# Patient Record
Sex: MALE | Race: WHITE | NOT HISPANIC OR LATINO | Employment: FULL TIME | URBAN - METROPOLITAN AREA
[De-identification: names, ages, dates, MRNs, and addresses within clinical notes are randomized per-mention and may not be internally consistent; named-entity substitution may affect disease eponyms.]

---

## 2018-08-06 ENCOUNTER — TELEPHONE (OUTPATIENT)
Dept: FAMILY MEDICINE | Facility: CLINIC | Age: 48
End: 2018-08-06

## 2018-08-06 DIAGNOSIS — Z12.5 SCREENING FOR PROSTATE CANCER: ICD-10-CM

## 2018-08-06 DIAGNOSIS — Z13.6 CARDIOVASCULAR SCREENING; LDL GOAL LESS THAN 100: Primary | ICD-10-CM

## 2018-08-06 DIAGNOSIS — Z13.1 SCREENING FOR DIABETES MELLITUS: ICD-10-CM

## 2018-08-07 PROBLEM — Z13.6 CARDIOVASCULAR SCREENING; LDL GOAL LESS THAN 100: Status: ACTIVE | Noted: 2018-08-07

## 2018-08-07 NOTE — TELEPHONE ENCOUNTER
Pt returning call and given message. Lab appt scheduled 8-13-18.    [facility]

## 2018-08-07 NOTE — TELEPHONE ENCOUNTER
I ordered his future labs,  Lipids, glucose and screen for prostate cancer.  He can get these done ahead of time if he wants to.  Raoul Kaufman MD  Family Medicine

## 2018-08-07 NOTE — TELEPHONE ENCOUNTER
Reason for call:  Order   Order or referral being requested: Fasting labs  Reason for request: physical  Date needed: as soon as possible  Has the patient been seen by the PCP for this problem? NO    Additional comments: patient want's labs done before appointment    Phone number to reach patient:  Home number on file [phone] (home)    Best Time:  anytime    Can we leave a detailed message on this number?  Not Applicable

## 2018-08-07 NOTE — TELEPHONE ENCOUNTER
Dr. Kaufman-    Patient has not yet been evaluated in 2 years. He didn't complete his fasting labs at that time (ordered by Erin- I extended these but I am not sure if they should be reordered for time line?). He is seeing you for a physical and would like to complete labs before that appt. Do you want anything further than lipids or would you like to see him first?    Karen Allen, SILVANON, RN

## 2018-08-13 DIAGNOSIS — Z13.1 SCREENING FOR DIABETES MELLITUS: ICD-10-CM

## 2018-08-13 DIAGNOSIS — Z12.5 SCREENING FOR PROSTATE CANCER: ICD-10-CM

## 2018-08-13 DIAGNOSIS — Z13.6 CARDIOVASCULAR SCREENING; LDL GOAL LESS THAN 100: ICD-10-CM

## 2018-08-13 LAB
CHOLEST SERPL-MCNC: 227 MG/DL
GLUCOSE SERPL-MCNC: 102 MG/DL (ref 70–99)
HDLC SERPL-MCNC: 37 MG/DL
LDLC SERPL CALC-MCNC: 119 MG/DL
NONHDLC SERPL-MCNC: 190 MG/DL
PSA SERPL-ACNC: 0.32 UG/L (ref 0–4)
TRIGL SERPL-MCNC: 355 MG/DL

## 2018-08-13 PROCEDURE — 36415 COLL VENOUS BLD VENIPUNCTURE: CPT | Performed by: FAMILY MEDICINE

## 2018-08-13 PROCEDURE — 82947 ASSAY GLUCOSE BLOOD QUANT: CPT | Performed by: FAMILY MEDICINE

## 2018-08-13 PROCEDURE — G0103 PSA SCREENING: HCPCS | Performed by: FAMILY MEDICINE

## 2018-08-13 PROCEDURE — 80061 LIPID PANEL: CPT | Performed by: FAMILY MEDICINE

## 2018-08-22 ENCOUNTER — OFFICE VISIT (OUTPATIENT)
Dept: FAMILY MEDICINE | Facility: CLINIC | Age: 48
End: 2018-08-22
Payer: COMMERCIAL

## 2018-08-22 VITALS
TEMPERATURE: 98.3 F | SYSTOLIC BLOOD PRESSURE: 118 MMHG | BODY MASS INDEX: 26.01 KG/M2 | WEIGHT: 192 LBS | DIASTOLIC BLOOD PRESSURE: 72 MMHG | HEART RATE: 72 BPM | HEIGHT: 72 IN

## 2018-08-22 DIAGNOSIS — Z12.11 SPECIAL SCREENING FOR MALIGNANT NEOPLASMS, COLON: ICD-10-CM

## 2018-08-22 DIAGNOSIS — Z00.00 ENCOUNTER FOR ROUTINE ADULT HEALTH EXAMINATION WITHOUT ABNORMAL FINDINGS: Primary | ICD-10-CM

## 2018-08-22 DIAGNOSIS — E78.1 HYPERTRIGLYCERIDEMIA: ICD-10-CM

## 2018-08-22 PROBLEM — Z13.6 CARDIOVASCULAR SCREENING; LDL GOAL LESS THAN 100: Status: RESOLVED | Noted: 2018-08-07 | Resolved: 2018-08-22

## 2018-08-22 PROCEDURE — 99396 PREV VISIT EST AGE 40-64: CPT | Performed by: FAMILY MEDICINE

## 2018-08-22 NOTE — PATIENT INSTRUCTIONS
Please make some dietary changes.    Recheck in 3 months.    Please get a colonoscopy.          Thank you for choosing [facility].  You may be receiving a survey in the mail from Jessica Hernandez regarding your visit today.  Please take a few minutes to complete and return the survey to let us know how we are doing.      If you have questions or concerns, please contact us via [employer] or you can contact your care team at [phone].    Our Clinic hours are:  Monday 6:40 am  to 7:00 pm  Tuesday -Friday 6:40 am to 5:00 pm    The [place] outpatient lab hours are:  Monday - Friday 6:10 am to 4:45 pm  Saturdays 7:00 am to 11:00 am  Appointments are required, call [phone]    If you have clinical questions after hours or would like to schedule an appointment,  call the clinic at [phone].          Preventive Health Recommendations  Male Ages 50 - 64    Yearly exam:             See your health care provider every year in order to  o   Review health changes.   o   Discuss preventive care.    o   Review your medicines if your doctor has prescribed any.     Have a cholesterol test every 5 years, or more frequently if you are at risk for high cholesterol/heart disease.     Have a diabetes test (fasting glucose) every three years. If you are at risk for diabetes, you should have this test more often.     Have a colonoscopy at age 50, or have a yearly FIT test (stool test). These exams will check for colon cancer.      Talk with your health care provider about whether or not a prostate cancer screening test (PSA) is right for you.    You should be tested each year for STDs (sexually transmitted diseases), if you re at risk.     Shots: Get a flu shot each year. Get a tetanus shot every 10 years.     Nutrition:    Eat at least 5 servings of fruits and vegetables daily.     Eat whole-grain bread, whole-wheat pasta and brown rice instead of white grains and rice.     Get adequate Calcium and Vitamin D.      Lifestyle    Exercise for at least 150 minutes a week (30 minutes a day, 5 days a week). This will help you control your weight and prevent disease.     Limit alcohol to one drink per day.     No smoking.     Wear sunscreen to prevent skin cancer.     See your dentist every six months for an exam and cleaning.     See your eye doctor every 1 to 2 years.

## 2018-08-22 NOTE — PROGRESS NOTES
"    SUBJECTIVE:   CC: Too Osman is an 48 year old male who presents for preventative health visit.   Chief Complaint   Patient presents with     Physical     Health Maintenance     due for colon cancer screening. Due for tetanus. He is declining the tetanus, he had a \"bad reaction\" to the last one.     Healthy Habits:    Do you get at least three servings of calcium containing foods daily (dairy, green leafy vegetables, etc.)? yes    Amount of exercise or daily activities, outside of work: 6-7 day(s) per week    Problems taking medications regularly not applicable    Medication side effects: No    Have you had an eye exam in the past two years? no    Do you see a dentist twice per year? yes    Do you have sleep apnea, excessive snoring or daytime drowsiness?no         Today's PHQ-2 Score:   PHQ-2 ( 1999 Pfizer) 8/22/2018   Q1: Little interest or pleasure in doing things 0   Q2: Feeling down, depressed or hopeless 0   PHQ-2 Score 0       Abuse: Current or Past(Physical, Sexual or Emotional)- No  Do you feel safe in your environment - Yes    Social History   Substance Use Topics     Smoking status: Never Smoker     Smokeless tobacco: Current User      Comment: tin lasts 4 days     Alcohol use 0.0 oz/week     0 Standard drinks or equivalent per week      Comment: 4-6 per week      If you drink alcohol do you typically have >3 drinks per day or >7 drinks per week? No                      Last PSA:   PSA   Date Value Ref Range Status   08/13/2018 0.32 0 - 4 ug/L Final     Comment:     Assay Method:  Chemiluminescence using Siemens Vista analyzer       Reviewed orders with patient. Reviewed health maintenance and updated orders accordingly - Yes      Reviewed and updated as needed this visit by clinical staff  Tobacco  Allergies  Med Hx  Surg Hx  Fam Hx  Soc Hx        Reviewed and updated as needed this visit by Provider            ROS:  CONSTITUTIONAL: NEGATIVE for fever, chills, change in " "weight  INTEGUMENTARY/SKIN: NEGATIVE for worrisome rashes, moles or lesions  EYES: NEGATIVE for vision changes or irritation  ENT: NEGATIVE for ear, mouth and throat problems  RESP: NEGATIVE for significant cough or SOB  CV: NEGATIVE for chest pain, palpitations or peripheral edema  GI: NEGATIVE for nausea, abdominal pain, heartburn, or change in bowel habits   male: negative for dysuria, hematuria, decreased urinary stream, erectile dysfunction, urethral discharge  MUSCULOSKELETAL: NEGATIVE for significant arthralgias or myalgia  NEURO: NEGATIVE for weakness, dizziness or paresthesias  PSYCHIATRIC: NEGATIVE for changes in mood or affect    OBJECTIVE:   /72 (Cuff Size: Adult Large)  Pulse 72  Temp 98.3  F (36.8  C) (Tympanic)  Ht 6' 0.25\" (1.835 m)  Wt 192 lb (87.1 kg)  BMI 25.86 kg/m2  EXAM:  GENERAL: healthy, alert and no distress  EYES: Eyes grossly normal to inspection, PERRL and conjunctivae and sclerae normal  HENT: ear canals and TM's normal, nose and mouth without ulcers or lesions  NECK: no adenopathy, no asymmetry, masses, or scars and thyroid normal to palpation  RESP: lungs clear to auscultation - no rales, rhonchi or wheezes  CV: regular rate and rhythm, normal S1 S2, no S3 or S4, no murmur, click or rub, no peripheral edema and peripheral pulses strong  ABDOMEN: soft, nontender, no hepatosplenomegaly, no masses and bowel sounds normal   (male): normal male genitalia without lesions or urethral discharge, no hernia  MS: no gross musculoskeletal defects noted, no edema  SKIN: no suspicious lesions or rashes  SKIN: even brown macule on right shin 6 mm in diameter, no other worrisome signs, monitor   NEURO: Normal strength and tone, mentation intact and speech normal  PSYCH: mentation appears normal, affect normal/bright  LYMPH: anterior cervical: no adenopathy  posterior cervical: no adenopathy        ASSESSMENT/PLAN:   (Z00.00) Encounter for routine adult health examination without " "abnormal findings  (primary encounter diagnosis)  Comment: Discussed healthy lifestyle and preventative cares.    Plan:     (E78.1) Hypertriglyceridemia  Comment: diet and exercise  Plan: Lipid panel reflex to direct LDL Fasting            (Z12.11) Special screening for malignant neoplasms, colon  Comment:   Plan: GASTROENTEROLOGY ADULT REF PROCEDURE ONLY              COUNSELING:  Reviewed preventive health counseling, as reflected in patient instructions       Regular exercise       Healthy diet/nutrition       Vision screening       Hearing screening       Colon cancer screening       Prostate cancer screening    BP Readings from Last 1 Encounters:   08/22/18 118/72     Estimated body mass index is 25.86 kg/(m^2) as calculated from the following:    Height as of this encounter: 6' 0.25\" (1.835 m).    Weight as of this encounter: 192 lb (87.1 kg).           reports that he has never smoked. He uses smokeless tobacco.      Counseling Resources:  ATP IV Guidelines  Pooled Cohorts Equation Calculator  FRAX Risk Assessment  ICSI Preventive Guidelines  Dietary Guidelines for Americans, 2010  USDA's MyPlate  ASA Prophylaxis  Lung CA Screening    Raoul Kaufman MD  [facility]  "

## 2018-08-22 NOTE — MR AVS SNAPSHOT
After Visit Summary   8/22/2018    Too Osman    MRN: [id number]           Patient Information     Date Of Birth          1970        Visit Information        Provider Department      8/22/2018 8:00 AM Raoul Kaufman MD [facility]        Today's Diagnoses     Encounter for routine adult health examination without abnormal findings    -  1    Hypertriglyceridemia        Special screening for malignant neoplasms, colon          Care Instructions    Please make some dietary changes.    Recheck in 3 months.    Please get a colonoscopy.          Thank you for choosing [facility].  You may be receiving a survey in the mail from Jessica Hernandez regarding your visit today.  Please take a few minutes to complete and return the survey to let us know how we are doing.      If you have questions or concerns, please contact us via [employer] or you can contact your care team at [phone].    Our Clinic hours are:  Monday 6:40 am  to 7:00 pm  Tuesday -Friday 6:40 am to 5:00 pm    The [place] outpatient lab hours are:  Monday - Friday 6:10 am to 4:45 pm  Saturdays 7:00 am to 11:00 am  Appointments are required, call [phone]    If you have clinical questions after hours or would like to schedule an appointment,  call the clinic at [phone].          Preventive Health Recommendations  Male Ages 50 - 64    Yearly exam:             See your health care provider every year in order to  o   Review health changes.   o   Discuss preventive care.    o   Review your medicines if your doctor has prescribed any.     Have a cholesterol test every 5 years, or more frequently if you are at risk for high cholesterol/heart disease.     Have a diabetes test (fasting glucose) every three years. If you are at risk for diabetes, you should have this test more often.     Have a colonoscopy at age 50, or have a yearly FIT test (stool test). These exams will check for colon cancer.      Talk with  your health care provider about whether or not a prostate cancer screening test (PSA) is right for you.    You should be tested each year for STDs (sexually transmitted diseases), if you re at risk.     Shots: Get a flu shot each year. Get a tetanus shot every 10 years.     Nutrition:    Eat at least 5 servings of fruits and vegetables daily.     Eat whole-grain bread, whole-wheat pasta and brown rice instead of white grains and rice.     Get adequate Calcium and Vitamin D.     Lifestyle    Exercise for at least 150 minutes a week (30 minutes a day, 5 days a week). This will help you control your weight and prevent disease.     Limit alcohol to one drink per day.     No smoking.     Wear sunscreen to prevent skin cancer.     See your dentist every six months for an exam and cleaning.     See your eye doctor every 1 to 2 years.            Follow-ups after your visit        Additional Services     GASTROENTEROLOGY ADULT REF PROCEDURE ONLY       Last Lab Result: No results found for: CR  Body mass index is 25.86 kg/(m^2).     Needed:  No  Language:  English    Patient will be contacted to schedule procedure.     Please be aware that coverage of these services is subject to the terms and limitations of your health insurance plan.  Call member services at your health plan with any benefit or coverage questions.  Any procedures must be performed at a [facility] facility OR coordinated by your clinic's referral office.    Please bring the following with you to your appointment:    (1) Any X-Rays, CTs or MRIs which have been performed.  Contact the facility where they were done to arrange for  prior to your scheduled appointment.    (2) List of current medications   (3) This referral request   (4) Any documents/labs given to you for this referral                  Follow-up notes from your care team     Return in about 3 months (around 11/22/2018) for Lab Work.      Future tests that were ordered for you  "today     Open Future Orders        Priority Expected Expires Ordered    Lipid panel reflex to direct LDL Fasting Routine 11/21/2018 2/20/2019 8/22/2018            Who to contact     If you have questions or need follow up information about today's clinic visit or your schedule please contact [facility] directly at [phone].  Normal or non-critical lab and imaging results will be communicated to you by MyChart, letter or phone within 4 business days after the clinic has received the results. If you do not hear from us within 7 days, please contact the clinic through MyChart or phone. If you have a critical or abnormal lab result, we will notify you by phone as soon as possible.  Submit refill requests through [employer] or call your pharmacy and they will forward the refill request to us. Please allow 3 business days for your refill to be completed.          Additional Information About Your Visit        Care EveryWhere ID     This is your Care EveryWhere ID. This could be used by other organizations to access your [facility] medical records  [id number]        Your Vitals Were     Pulse Temperature Height BMI (Body Mass Index)          72 98.3  F (36.8  C) (Tympanic) 6' 0.25\" (1.835 m) 25.86 kg/m2         Blood Pressure from Last 3 Encounters:   08/22/18 118/72   04/19/16 130/80    Weight from Last 3 Encounters:   08/22/18 192 lb (87.1 kg)   04/19/16 205 lb (93 kg)              We Performed the Following     GASTROENTEROLOGY ADULT REF PROCEDURE ONLY        Primary Care Provider Office Phone # Fax #    Umu Velez, APRN [facility] [phone] [address] [facility] [address]        Equal Access to Services     SUNITHA WALTON AH: Hadii zachary concepcion Soyaneth, waaxda luqadaha, qaybta kaalmada galilea dickens. So [facility] [phone].    ATENCIÓN: Si habla español, tiene a rios disposición servicios gratuitos de asistencia lingüística. Llame al " [phone].    We comply with applicable federal civil rights laws and [place] laws. We do not discriminate on the basis of race, color, national origin, age, disability, sex, sexual orientation, or gender identity.            Thank you!     Thank you for choosing [facility]  for your care. Our goal is always to provide you with excellent care. Hearing back from our patients is one way we can continue to improve our services. Please take a few minutes to complete the written survey that you may receive in the mail after your visit with us. Thank you!             Your Updated Medication List - Protect others around you: Learn how to safely use, store and throw away your medicines at www.disposemymeds.org.      Notice  As of 8/22/2018  8:52 AM    You have not been prescribed any medications.

## 2018-09-28 ENCOUNTER — ANESTHESIA EVENT (OUTPATIENT)
Dept: GASTROENTEROLOGY | Facility: CLINIC | Age: 48
End: 2018-09-28
Payer: COMMERCIAL

## 2018-09-28 NOTE — ANESTHESIA PREPROCEDURE EVALUATION
Anesthesia Evaluation     . Pt has not had prior anesthetic            ROS/MED HX    ENT/Pulmonary:       Neurologic:       Cardiovascular:     (+) Dyslipidemia, ----. : . . . :. .       METS/Exercise Tolerance:     Hematologic:         Musculoskeletal:         GI/Hepatic:         Renal/Genitourinary:         Endo:         Psychiatric:         Infectious Disease:         Malignancy:         Other:                     Physical Exam  Normal systems: cardiovascular, pulmonary and dental    Airway   Mallampati: I  TM distance: >3 FB  Neck ROM: full    Dental     Cardiovascular   Rhythm and rate: regular and normal      Pulmonary    breath sounds clear to auscultation                    Anesthesia Plan      History & Physical Review  History and physical reviewed and following examination; no interval change.    ASA Status:  1 .    NPO Status:  > 6 hours    Plan for MAC Reason for MAC:  Deep or markedly invasive procedure (G8)         Postoperative Care      Consents  Anesthetic plan, risks, benefits and alternatives discussed with:  Patient..                          .

## 2018-10-02 ENCOUNTER — SURGERY (OUTPATIENT)
Age: 48
End: 2018-10-02

## 2018-10-02 ENCOUNTER — HOSPITAL ENCOUNTER (OUTPATIENT)
Facility: CLINIC | Age: 48
Discharge: HOME | End: 2018-10-02
Attending: SURGERY | Admitting: SURGERY
Payer: COMMERCIAL

## 2018-10-02 ENCOUNTER — ANESTHESIA (OUTPATIENT)
Dept: GASTROENTEROLOGY | Facility: CLINIC | Age: 48
End: 2018-10-02
Payer: COMMERCIAL

## 2018-10-02 VITALS
WEIGHT: 192 LBS | OXYGEN SATURATION: 96 % | RESPIRATION RATE: 20 BRPM | DIASTOLIC BLOOD PRESSURE: 69 MMHG | TEMPERATURE: 98.5 F | HEIGHT: 72 IN | SYSTOLIC BLOOD PRESSURE: 109 MMHG | BODY MASS INDEX: 26.01 KG/M2

## 2018-10-02 LAB — COLONOSCOPY: NORMAL

## 2018-10-02 PROCEDURE — 45385 COLONOSCOPY W/LESION REMOVAL: CPT | Mod: PT | Performed by: SURGERY

## 2018-10-02 PROCEDURE — 45385 COLONOSCOPY W/LESION REMOVAL: CPT | Performed by: SURGERY

## 2018-10-02 PROCEDURE — 25000128 H RX IP 250 OP 636: Performed by: NURSE ANESTHETIST, CERTIFIED REGISTERED

## 2018-10-02 PROCEDURE — 37000008 ZZH ANESTHESIA TECHNICAL FEE, 1ST 30 MIN: Performed by: SURGERY

## 2018-10-02 PROCEDURE — 88305 TISSUE EXAM BY PATHOLOGIST: CPT | Mod: 26 | Performed by: SURGERY

## 2018-10-02 PROCEDURE — 25000128 H RX IP 250 OP 636: Performed by: SURGERY

## 2018-10-02 PROCEDURE — 25000125 ZZHC RX 250: Performed by: SURGERY

## 2018-10-02 PROCEDURE — 88305 TISSUE EXAM BY PATHOLOGIST: CPT | Performed by: SURGERY

## 2018-10-02 RX ORDER — SODIUM CHLORIDE, SODIUM LACTATE, POTASSIUM CHLORIDE, CALCIUM CHLORIDE 600; 310; 30; 20 MG/100ML; MG/100ML; MG/100ML; MG/100ML
INJECTION, SOLUTION INTRAVENOUS CONTINUOUS
Status: DISCONTINUED | OUTPATIENT
Start: 2018-10-02 | End: 2018-10-02 | Stop reason: HOSPADM

## 2018-10-02 RX ORDER — ONDANSETRON 2 MG/ML
4 INJECTION INTRAMUSCULAR; INTRAVENOUS
Status: DISCONTINUED | OUTPATIENT
Start: 2018-10-02 | End: 2018-10-02 | Stop reason: HOSPADM

## 2018-10-02 RX ORDER — LIDOCAINE 40 MG/G
CREAM TOPICAL
Status: DISCONTINUED | OUTPATIENT
Start: 2018-10-02 | End: 2018-10-02 | Stop reason: HOSPADM

## 2018-10-02 RX ORDER — PROPOFOL 10 MG/ML
INJECTION, EMULSION INTRAVENOUS PRN
Status: DISCONTINUED | OUTPATIENT
Start: 2018-10-02 | End: 2018-10-02

## 2018-10-02 RX ADMIN — SODIUM CHLORIDE, POTASSIUM CHLORIDE, SODIUM LACTATE AND CALCIUM CHLORIDE: 600; 310; 30; 20 INJECTION, SOLUTION INTRAVENOUS at 09:47

## 2018-10-02 RX ADMIN — PROPOFOL 100 MG: 10 INJECTION, EMULSION INTRAVENOUS at 10:10

## 2018-10-02 RX ADMIN — PROPOFOL 100 MG: 10 INJECTION, EMULSION INTRAVENOUS at 10:04

## 2018-10-02 RX ADMIN — LIDOCAINE HYDROCHLORIDE 0.1 ML: 10 INJECTION, SOLUTION EPIDURAL; INFILTRATION; INTRACAUDAL; PERINEURAL at 09:47

## 2018-10-02 RX ADMIN — PROPOFOL 100 MG: 10 INJECTION, EMULSION INTRAVENOUS at 10:01

## 2018-10-02 RX ADMIN — PROPOFOL 150 MG: 10 INJECTION, EMULSION INTRAVENOUS at 09:59

## 2018-10-02 RX ADMIN — PROPOFOL 150 MG: 10 INJECTION, EMULSION INTRAVENOUS at 09:58

## 2018-10-02 NOTE — H&P
"48 year old year old male here for colonoscopy for screening.    Patient Active Problem List   Diagnosis     Hypertriglyceridemia       No past medical history on file.    No past surgical history on file.    @[facility]@    No current outpatient prescriptions on file.       Allergies   Allergen Reactions     Shellfish-Derived Products      Not sure, but thinks this may cause throat problems       Pt reports that he has never smoked. He uses smokeless tobacco. He reports that he drinks alcohol.    Exam:  BP (!) 125/95  Temp 98.5  F (36.9  C) (Oral)  Resp 18  Ht 1.835 m (6' 0.25\")  Wt 87.1 kg (192 lb)  SpO2 97%  BMI 25.86 kg/m2    Awake, Alert OX3  Lungs - CTA bilaterally  CV - RRR, no murmurs, distal pulses intact  Abd - soft, non-distended, non-tender, +BS  Extr - No cyanosis or edema    A/P 48 year old year old male in need of colonoscopy for screening. Risks, benefits, alternatives, and complications were discussed including the possibility of perforation and the patient agreed to proceed    Barney Davis MD   "

## 2018-10-02 NOTE — BRIEF OP NOTE
[facility]   Brief Operative Note    Pre-operative diagnosis: screening   Post-operative diagnosis single polyp, otherwise normal     Procedure: Procedure(s):  colonoscopy - Wound Class: II-Clean Contaminated   Surgeon(s): Surgeon(s) and Role:     * Barney Davis MD - Primary   Estimated blood loss: * No values recorded between 10/2/2018 12:00 AM and 10/2/2018 10:24 AM *    Specimens: * No specimens in log *   Findings: 1. 5 mm polyp cecum - resected, partially retrieved  2. Colon otherwise normal

## 2018-10-02 NOTE — ANESTHESIA POSTPROCEDURE EVALUATION
Patient: Too Osman    Procedure(s):  colonoscopy - Wound Class: II-Clean Contaminated    Diagnosis:screening  Diagnosis Additional Information: No value filed.    Anesthesia Type:  No value filed.    Note:  Anesthesia Post Evaluation    Patient location during evaluation: Bedside  Patient participation: Able to fully participate in evaluation  Level of consciousness: awake and alert  Pain management: adequate  Airway patency: patent  Cardiovascular status: acceptable  Respiratory status: acceptable  Hydration status: acceptable  PONV: none     Anesthetic complications: None          Last vitals:  Vitals:    10/02/18 0925   BP: (!) 125/95   Resp: 18   Temp: 36.9  C (98.5  F)   SpO2: 97%         Electronically Signed By: KIRA Richards CRNA  October 2, 2018  10:24 AM

## 2018-10-03 ENCOUNTER — TELEPHONE (OUTPATIENT)
Dept: PEDIATRICS | Facility: CLINIC | Age: 48
End: 2018-10-03

## 2018-10-03 DIAGNOSIS — Z13.5 SCREENING FOR EYE CONDITION: Primary | ICD-10-CM

## 2018-10-03 LAB — COPATH REPORT: NORMAL

## 2018-10-03 NOTE — TELEPHONE ENCOUNTER
Reason for call:  Order   Order or referral being requested: referral for eye Dr  Reason for request: [facility] ph. [phone]--Dr kirsten short- for routine screening.   Date needed: 10/03/2018  Has the patient been seen by the PCP for this problem? NO    Additional comments: insurance requires referral for eye dr.     Phone number to reach patient:  Cell number on file:    Telephone Information:   Mobile [phone]       Best Time:  any    Can we leave a detailed message on this number?  YES

## 2020-06-29 ENCOUNTER — OFFICE VISIT (OUTPATIENT)
Dept: FAMILY MEDICINE | Facility: CLINIC | Age: 50
End: 2020-06-29
Payer: COMMERCIAL

## 2020-06-29 VITALS
DIASTOLIC BLOOD PRESSURE: 78 MMHG | HEART RATE: 105 BPM | RESPIRATION RATE: 16 BRPM | BODY MASS INDEX: 24.81 KG/M2 | TEMPERATURE: 99 F | WEIGHT: 183.2 LBS | SYSTOLIC BLOOD PRESSURE: 138 MMHG | HEIGHT: 72 IN | OXYGEN SATURATION: 97 %

## 2020-06-29 DIAGNOSIS — B86 SCABIES: Primary | ICD-10-CM

## 2020-06-29 DIAGNOSIS — L23.7 CONTACT DERMATITIS DUE TO POISON IVY: ICD-10-CM

## 2020-06-29 PROCEDURE — 99213 OFFICE O/P EST LOW 20 MIN: CPT | Performed by: NURSE PRACTITIONER

## 2020-06-29 RX ORDER — PREDNISONE 20 MG/1
20 TABLET ORAL DAILY
Qty: 5 TABLET | Refills: 0 | Status: SHIPPED | OUTPATIENT
Start: 2020-06-29 | End: 2020-07-04

## 2020-06-29 RX ORDER — PERMETHRIN 50 MG/G
CREAM TOPICAL
Qty: 120 G | Refills: 1 | Status: SHIPPED | OUTPATIENT
Start: 2020-06-29 | End: 2021-01-25

## 2020-06-29 ASSESSMENT — MIFFLIN-ST. JEOR: SCORE: 1723.99

## 2020-06-29 NOTE — PROGRESS NOTES
Subjective     Too Osman is a 49 year old male who presents to clinic today for the following health issues:    HPI   Rash  Onset: 1-2 weeks     Description:   Location: arms, torso, feet, legs  Character: raised, draining, red  Itching (Pruritis): YES- worse at night    Progression of Symptoms:  worsening and spreading     Accompanying Signs & Symptoms:  Fever: no   Body aches or joint pain: no   Sore throat symptoms: no   Recent cold symptoms: no     History:   Previous similar rash: YES- bug bites     Precipitating factors:   Exposure to similar rash: no   New exposures: been outside and working outside. Dog is on antibiotics from skin infection.    Recent travel: no     Alleviating factors:  Benadryl, cortisone cream, calamine     Therapies Tried and outcome: listed above       Patient Active Problem List   Diagnosis     Hypertriglyceridemia     History reviewed. No pertinent surgical history.    Social History     Tobacco Use     Smoking status: Never Smoker     Smokeless tobacco: Current User     Types: Chew     Tobacco comment: tin lasts 4 days   Substance Use Topics     Alcohol use: Yes     Alcohol/week: 0.0 standard drinks     Comment: 4-6 per week     Family History   Problem Relation Age of Onset     Arthritis Father         gout     Other Cancer Father         ??melanoma     Dementia Maternal Grandfather      Eye Disorder Paternal Grandmother         mac degen     Hypertension Paternal Grandmother              Reviewed and updated as needed this visit by Provider         Review of Systems   Constitutional, HEENT, cardiovascular, pulmonary, gi and gu systems are negative, except as otherwise noted.      Objective    /78 (BP Location: Right arm, Patient Position: Sitting, Cuff Size: Adult Regular)   Pulse 105   Temp 99  F (37.2  C) (Tympanic)   Resp 16   Ht 1.829 m (6')   Wt 83.1 kg (183 lb 3.2 oz)   SpO2 97%   BMI 24.85 kg/m    Body mass index is 24.85 kg/m .  Physical Exam    GENERAL: healthy, alert and no distress  SKIN: small papules with linear burrows to toe webs, ankles, bilateral lower legs, bilateral arms and trunk. One area of dried papules surrounded by erythematous base near right elbow.  NEURO: Normal strength and tone, mentation intact and speech normal    Diagnostic Test Results:  none         Assessment & Plan       ICD-10-CM    1. Scabies  B86 permethrin (ELIMITE) 5 % external cream   2. Contact dermatitis due to poison ivy  L23.7 predniSONE (DELTASONE) 20 MG tablet        Tobacco Cessation:   reports that he has never smoked. His smokeless tobacco use includes chew.  Tobacco Cessation Action Plan: Information offered: Patient not interested at this time        FUTURE APPOINTMENTS:       - Follow up in 2 weeks for persistent symptoms, sooner for new or worsening symptoms.     Patient Instructions     Patient Education     Scabies  Scabies is a skin infection. It is caused by a tiny parasitic insect, or mite, that is too small to see directly. It can be seen under a microscope, but it is usually recognized only by the rash and symptoms it causes. This can make it hard to diagnose since the signs and symptoms can be similar to other diseases.  The scabies mite tunnels under the skin. It creates a small burrow, where it leaves its eggs. These eggs richard and grow into adults. They then create new burrows over the next 1 to 2 weeks. The mites die in about 4 to 6 weeks. The rash and itching are caused by an allergic reaction to the scabies saliva or feces.  Scabies is highly contagious. It is spread by direct skin contact. It is easily spread by close personal contact, sexual contact, or by sharing bed linens or clothing used by an infected person.  It may take 4 to 6 weeks for symptoms to appear after being exposed. Everyone living in the house with you, as well as your sexual partners, should be treated at the same time. After the first treatment, you will no longer be  contagious. You may return to work, school or .  Home care    Machine wash in hot water all sheets, towels, pillowcases, underwear, pajamas, and any other clothing you have worn lately. Use the hot cycle of a dryer or use a hot iron to sterilize.    Seal anything that is hard to wash in a plastic trash bag for 4 days. This includes coats, jackets, blankets, and bedspreads. (The insects die after 3 days off the human body.)  Medicines  Scabicides  Medicines used to treat scabies are called scabicides. These are creams that kill the scabies mites. A prescription is needed. When using these medicines:    Always follow instructions provided by your healthcare provider and pharmacist. Also follow the printed instructions that come with the medicine.    Talk with your provider about precautions to take when using these medicines.    Use the cream on your body when your skin is cool and dry. Don t use it after a hot shower or bath.    Usually the cream is put on your whole body. This means from your chin all the way down to your toes. Scabies does not usually affect an adult s head. So cream is not needed there. For children, discuss this with your child s provider.    Leave the cream or lotion on for the recommended amount of time. This is usually 8 to 12 hours.    Don t leave cream or lotion on your skin longer than directed. Don t use more than recommended.    Clean clothes should be worn after the treatment.    If you wash your hands after using the cream, you will need to reapply the cream to your hands.    If you are breastfeeding, wash off your nipples before feeding. Then reapply the cream after breastfeeding.    For babies or infants, put mittens on their hands. This will stop them from licking the cream or lotion. It will also stop them from scratching themselves because of the itching.  Other medicines    An oral medicine called ivermectin may be prescribed for severe cases. It may also be used if you  can t apply creams.    Itching may cause the most discomfort. If large areas of your skin are affected, over-the-counter antihistamines may be used to reduce itching. Or you may be given a prescription antihistamine. Some of these medicines may make you sleepy. They are best used at bedtime. Antihistamines that don t make you sleepy can be used during the day. Note: Don t use medicine that has diphenhydramine if you have glaucoma, or if you are a man who has trouble urinating due to an enlarged prostate.    If you were given antibiotics due to a bacterial infection, take them until they are finished. It is important to finish the antibiotics even if the wound looks better. This is to make sure the infection has cleared.  Follow-up care  Follow up with your healthcare provider, or as advised. Call your provider if your symptoms don t improve after 1 week, or if new burrows or rashes appear.  When to seek medical advice  Call your healthcare provider right away if any of these occur:    Yellow-brown crusts or drainage from the sores    Other signs of infection, including increasing redness, swelling, pain, or pus    Fever of 100.4 F (38 C) or higher, or as directed by your provider  Date Last Reviewed: 8/1/2016 2000-2019 [employer]. [address] [place] [address], [place] [address]. All rights reserved. This information is not intended as a substitute for professional medical care. Always follow your healthcare professional's instructions.           Patient Education     Contact Dermatitis  Contact dermatitis is a skin rash caused by something that touches the skin and makes it irritated and inflamed. Your skin may be red, swollen, dry, and may be cracked. Blisters may form and ooze. The rash will itch.  Contact dermatitis can form on the face and neck, backs of hands, forearms, genitals, and lower legs.  People can get contact dermatitis from lots of sources. These include:    Plants such as poison ivy,  "oak, or sumac    Chemicals in hair dyes and rinses, soaps, solvents, waxes, fingernail polish, and deodorants     Jewelry or watchbands made of nickel  Contact dermatitis is not passed from person to person.  Talk with your healthcare provider about what may have caused the rash. A type of allergy testing called \"patch testing\" may be used to discover what you are allergic to. You will need to avoid the source of your rash in the future to prevent it from coming back.  Treatment is done to relieve itching and prevent the rash from coming back. The rash should go away in a few days to a few weeks.  Home care  Your healthcare provider may prescribe medicine to relieve swelling and itching. Follow all instructions when using these medicines.  General care:    Avoid anything that heats up your skin, such as hot showers or baths, or direct sunlight. This can make itching worse.    Apply cold compresses to soothe your sores to help relieve your symptoms. Do this for 30 minutes 3 to 4 times a day. You can make a cold compress by soaking a cloth in cold water. Squeeze out excess water. You can add colloidal oatmeal to the water to help reduce itching. For severe itching in a small area, apply an ice pack wrapped in a thin towel. Do this for 20 minutes 3 to 4 times a day.    You can also try wet dressings. One way to do this is to wear a wet piece of clothing under a dry one. Wear a damp shirt under a dry shirt if your upper body is affected. This can relieve itching and prevent you from scratching the affected area.    You can also help relieve large areas of itching by taking a lukewarm bath with colloidal oatmeal added to the water.    Use hydrocortisone cream for redness and irritation, unless another medicine was prescribed. You can also use benzocaine anesthetic cream or spray. Calamine lotion can also relieve mild symptoms.    Use oral diphenhydramine to help reduce itching. You can buy this antihistamine at drug and " grocery stores. It can make you sleepy, so use lower doses during the daytime. Or you can use loratadine. This is an antihistamine that will not make you sleepy. Do not use diphenhydramine if you have glaucoma or have trouble urinating due to an enlarged prostate.    If a plant causes your rash, make sure to wash your skin and the clothes you were wearing when you came into contact with the plant. This is to wash away the plant oils that gave you the rash and prevent more or worse symptoms.    Stay away from the substance or object that causes your symptoms. If you can t avoid it, wear gloves or some other type of protection.  Follow-up care  Follow up with your healthcare provider, or as advised.  When to seek medical advice  Call your healthcare provider right away if any of these occur:    Spreading of the rash to other parts of your body    Severe swelling of your face, eyelids, mouth, throat or tongue    Trouble urinating due to swelling in the genital area    Fever of 100.4 F (38 C) or higher    Redness or swelling that gets worse    Pain that gets worse    Foul-smelling fluid leaking from the skin    Yellow-brown crusts on the open blisters  Date Last Reviewed: 9/1/2016 2000-2019 [employer]. [address] [place] [address], [place] [address]. All rights reserved. This information is not intended as a substitute for professional medical care. Always follow your healthcare professional's instructions.               No follow-ups on file.    KIRA Lott [facility]

## 2020-06-29 NOTE — PATIENT INSTRUCTIONS
Patient Education     Scabies  Scabies is a skin infection. It is caused by a tiny parasitic insect, or mite, that is too small to see directly. It can be seen under a microscope, but it is usually recognized only by the rash and symptoms it causes. This can make it hard to diagnose since the signs and symptoms can be similar to other diseases.  The scabies mite tunnels under the skin. It creates a small burrow, where it leaves its eggs. These eggs richard and grow into adults. They then create new burrows over the next 1 to 2 weeks. The mites die in about 4 to 6 weeks. The rash and itching are caused by an allergic reaction to the scabies saliva or feces.  Scabies is highly contagious. It is spread by direct skin contact. It is easily spread by close personal contact, sexual contact, or by sharing bed linens or clothing used by an infected person.  It may take 4 to 6 weeks for symptoms to appear after being exposed. Everyone living in the house with you, as well as your sexual partners, should be treated at the same time. After the first treatment, you will no longer be contagious. You may return to work, school or .  Home care    Machine wash in hot water all sheets, towels, pillowcases, underwear, pajamas, and any other clothing you have worn lately. Use the hot cycle of a dryer or use a hot iron to sterilize.    Seal anything that is hard to wash in a plastic trash bag for 4 days. This includes coats, jackets, blankets, and bedspreads. (The insects die after 3 days off the human body.)  Medicines  Scabicides  Medicines used to treat scabies are called scabicides. These are creams that kill the scabies mites. A prescription is needed. When using these medicines:    Always follow instructions provided by your healthcare provider and pharmacist. Also follow the printed instructions that come with the medicine.    Talk with your provider about precautions to take when using these medicines.    Use the cream  on your body when your skin is cool and dry. Don t use it after a hot shower or bath.    Usually the cream is put on your whole body. This means from your chin all the way down to your toes. Scabies does not usually affect an adult s head. So cream is not needed there. For children, discuss this with your child s provider.    Leave the cream or lotion on for the recommended amount of time. This is usually 8 to 12 hours.    Don t leave cream or lotion on your skin longer than directed. Don t use more than recommended.    Clean clothes should be worn after the treatment.    If you wash your hands after using the cream, you will need to reapply the cream to your hands.    If you are breastfeeding, wash off your nipples before feeding. Then reapply the cream after breastfeeding.    For babies or infants, put mittens on their hands. This will stop them from licking the cream or lotion. It will also stop them from scratching themselves because of the itching.  Other medicines    An oral medicine called ivermectin may be prescribed for severe cases. It may also be used if you can t apply creams.    Itching may cause the most discomfort. If large areas of your skin are affected, over-the-counter antihistamines may be used to reduce itching. Or you may be given a prescription antihistamine. Some of these medicines may make you sleepy. They are best used at bedtime. Antihistamines that don t make you sleepy can be used during the day. Note: Don t use medicine that has diphenhydramine if you have glaucoma, or if you are a man who has trouble urinating due to an enlarged prostate.    If you were given antibiotics due to a bacterial infection, take them until they are finished. It is important to finish the antibiotics even if the wound looks better. This is to make sure the infection has cleared.  Follow-up care  Follow up with your healthcare provider, or as advised. Call your provider if your symptoms don t improve after 1  "week, or if new burrows or rashes appear.  When to seek medical advice  Call your healthcare provider right away if any of these occur:    Yellow-brown crusts or drainage from the sores    Other signs of infection, including increasing redness, swelling, pain, or pus    Fever of 100.4 F (38 C) or higher, or as directed by your provider  Date Last Reviewed: 8/1/2016 2000-2019 [employer]. [address] [place] [address], [place] [address]. All rights reserved. This information is not intended as a substitute for professional medical care. Always follow your healthcare professional's instructions.           Patient Education     Contact Dermatitis  Contact dermatitis is a skin rash caused by something that touches the skin and makes it irritated and inflamed. Your skin may be red, swollen, dry, and may be cracked. Blisters may form and ooze. The rash will itch.  Contact dermatitis can form on the face and neck, backs of hands, forearms, genitals, and lower legs.  People can get contact dermatitis from lots of sources. These include:    Plants such as poison ivy, oak, or sumac    Chemicals in hair dyes and rinses, soaps, solvents, waxes, fingernail polish, and deodorants     Jewelry or watchbands made of nickel  Contact dermatitis is not passed from person to person.  Talk with your healthcare provider about what may have caused the rash. A type of allergy testing called \"patch testing\" may be used to discover what you are allergic to. You will need to avoid the source of your rash in the future to prevent it from coming back.  Treatment is done to relieve itching and prevent the rash from coming back. The rash should go away in a few days to a few weeks.  Home care  Your healthcare provider may prescribe medicine to relieve swelling and itching. Follow all instructions when using these medicines.  General care:    Avoid anything that heats up your skin, such as hot showers or baths, or direct sunlight. This " can make itching worse.    Apply cold compresses to soothe your sores to help relieve your symptoms. Do this for 30 minutes 3 to 4 times a day. You can make a cold compress by soaking a cloth in cold water. Squeeze out excess water. You can add colloidal oatmeal to the water to help reduce itching. For severe itching in a small area, apply an ice pack wrapped in a thin towel. Do this for 20 minutes 3 to 4 times a day.    You can also try wet dressings. One way to do this is to wear a wet piece of clothing under a dry one. Wear a damp shirt under a dry shirt if your upper body is affected. This can relieve itching and prevent you from scratching the affected area.    You can also help relieve large areas of itching by taking a lukewarm bath with colloidal oatmeal added to the water.    Use hydrocortisone cream for redness and irritation, unless another medicine was prescribed. You can also use benzocaine anesthetic cream or spray. Calamine lotion can also relieve mild symptoms.    Use oral diphenhydramine to help reduce itching. You can buy this antihistamine at drug and grocery stores. It can make you sleepy, so use lower doses during the daytime. Or you can use loratadine. This is an antihistamine that will not make you sleepy. Do not use diphenhydramine if you have glaucoma or have trouble urinating due to an enlarged prostate.    If a plant causes your rash, make sure to wash your skin and the clothes you were wearing when you came into contact with the plant. This is to wash away the plant oils that gave you the rash and prevent more or worse symptoms.    Stay away from the substance or object that causes your symptoms. If you can t avoid it, wear gloves or some other type of protection.  Follow-up care  Follow up with your healthcare provider, or as advised.  When to seek medical advice  Call your healthcare provider right away if any of these occur:    Spreading of the rash to other parts of your  body    Severe swelling of your face, eyelids, mouth, throat or tongue    Trouble urinating due to swelling in the genital area    Fever of 100.4 F (38 C) or higher    Redness or swelling that gets worse    Pain that gets worse    Foul-smelling fluid leaking from the skin    Yellow-brown crusts on the open blisters  Date Last Reviewed: 9/1/2016 2000-2019 [employer]. [address] [place] [address], [place] [address]. All rights reserved. This information is not intended as a substitute for professional medical care. Always follow your healthcare professional's instructions.

## 2021-01-20 ENCOUNTER — TELEPHONE (OUTPATIENT)
Dept: FAMILY MEDICINE | Facility: CLINIC | Age: 51
End: 2021-01-20

## 2021-01-20 DIAGNOSIS — Z13.1 SCREENING FOR DIABETES MELLITUS: ICD-10-CM

## 2021-01-20 DIAGNOSIS — E78.1 HYPERTRIGLYCERIDEMIA: Primary | ICD-10-CM

## 2021-01-20 DIAGNOSIS — Z12.5 SCREENING FOR PROSTATE CANCER: ICD-10-CM

## 2021-01-25 ENCOUNTER — APPOINTMENT (OUTPATIENT)
Dept: GENERAL RADIOLOGY | Facility: CLINIC | Age: 51
End: 2021-01-25
Attending: EMERGENCY MEDICINE
Payer: COMMERCIAL

## 2021-01-25 ENCOUNTER — HOSPITAL ENCOUNTER (EMERGENCY)
Facility: CLINIC | Age: 51
Discharge: HOME | End: 2021-01-25
Attending: EMERGENCY MEDICINE | Admitting: EMERGENCY MEDICINE
Payer: COMMERCIAL

## 2021-01-25 ENCOUNTER — NURSE TRIAGE (OUTPATIENT)
Dept: FAMILY MEDICINE | Facility: CLINIC | Age: 51
End: 2021-01-25

## 2021-01-25 ENCOUNTER — ANCILLARY PROCEDURE (OUTPATIENT)
Dept: ULTRASOUND IMAGING | Facility: CLINIC | Age: 51
End: 2021-01-25
Attending: EMERGENCY MEDICINE
Payer: COMMERCIAL

## 2021-01-25 VITALS
SYSTOLIC BLOOD PRESSURE: 139 MMHG | TEMPERATURE: 97.8 F | DIASTOLIC BLOOD PRESSURE: 91 MMHG | HEART RATE: 77 BPM | WEIGHT: 195 LBS | RESPIRATION RATE: 13 BRPM | BODY MASS INDEX: 26.45 KG/M2 | OXYGEN SATURATION: 99 %

## 2021-01-25 DIAGNOSIS — R07.9 CHEST PAIN, UNSPECIFIED TYPE: ICD-10-CM

## 2021-01-25 LAB
ANION GAP SERPL CALCULATED.3IONS-SCNC: 4 MMOL/L (ref 3–14)
BASOPHILS # BLD AUTO: 0 10E9/L (ref 0–0.2)
BASOPHILS NFR BLD AUTO: 0.3 %
BUN SERPL-MCNC: 18 MG/DL (ref 7–30)
CALCIUM SERPL-MCNC: 9.3 MG/DL (ref 8.5–10.1)
CHLORIDE SERPL-SCNC: 105 MMOL/L (ref 94–109)
CO2 SERPL-SCNC: 27 MMOL/L (ref 20–32)
CREAT SERPL-MCNC: 1.12 MG/DL (ref 0.66–1.25)
DIFFERENTIAL METHOD BLD: NORMAL
EOSINOPHIL # BLD AUTO: 0 10E9/L (ref 0–0.7)
EOSINOPHIL NFR BLD AUTO: 0.3 %
ERYTHROCYTE [DISTWIDTH] IN BLOOD BY AUTOMATED COUNT: 12 % (ref 10–15)
GFR SERPL CREATININE-BSD FRML MDRD: 75 ML/MIN/{1.73_M2}
GLUCOSE SERPL-MCNC: 115 MG/DL (ref 70–99)
HCT VFR BLD AUTO: 42.1 % (ref 40–53)
HGB BLD-MCNC: 14.9 G/DL (ref 13.3–17.7)
IMM GRANULOCYTES # BLD: 0 10E9/L (ref 0–0.4)
IMM GRANULOCYTES NFR BLD: 0.1 %
LYMPHOCYTES # BLD AUTO: 2.1 10E9/L (ref 0.8–5.3)
LYMPHOCYTES NFR BLD AUTO: 26.6 %
MCH RBC QN AUTO: 32 PG (ref 26.5–33)
MCHC RBC AUTO-ENTMCNC: 35.4 G/DL (ref 31.5–36.5)
MCV RBC AUTO: 91 FL (ref 78–100)
MONOCYTES # BLD AUTO: 0.5 10E9/L (ref 0–1.3)
MONOCYTES NFR BLD AUTO: 6.3 %
NEUTROPHILS # BLD AUTO: 5.3 10E9/L (ref 1.6–8.3)
NEUTROPHILS NFR BLD AUTO: 66.4 %
NRBC # BLD AUTO: 0 10*3/UL
NRBC BLD AUTO-RTO: 0 /100
PLATELET # BLD AUTO: 203 10E9/L (ref 150–450)
POTASSIUM SERPL-SCNC: 3.8 MMOL/L (ref 3.4–5.3)
RBC # BLD AUTO: 4.65 10E12/L (ref 4.4–5.9)
SODIUM SERPL-SCNC: 136 MMOL/L (ref 133–144)
TROPONIN I SERPL-MCNC: <0.015 UG/L (ref 0–0.04)
WBC # BLD AUTO: 8 10E9/L (ref 4–11)

## 2021-01-25 PROCEDURE — 93010 ELECTROCARDIOGRAM REPORT: CPT | Mod: 59 | Performed by: EMERGENCY MEDICINE

## 2021-01-25 PROCEDURE — 85025 COMPLETE CBC W/AUTO DIFF WBC: CPT | Performed by: EMERGENCY MEDICINE

## 2021-01-25 PROCEDURE — 99285 EMERGENCY DEPT VISIT HI MDM: CPT | Mod: 25 | Performed by: EMERGENCY MEDICINE

## 2021-01-25 PROCEDURE — 84484 ASSAY OF TROPONIN QUANT: CPT | Performed by: EMERGENCY MEDICINE

## 2021-01-25 PROCEDURE — 80048 BASIC METABOLIC PNL TOTAL CA: CPT | Performed by: EMERGENCY MEDICINE

## 2021-01-25 PROCEDURE — 93308 TTE F-UP OR LMTD: CPT | Mod: 26 | Performed by: EMERGENCY MEDICINE

## 2021-01-25 PROCEDURE — 93308 TTE F-UP OR LMTD: CPT | Performed by: EMERGENCY MEDICINE

## 2021-01-25 PROCEDURE — 71046 X-RAY EXAM CHEST 2 VIEWS: CPT

## 2021-01-25 PROCEDURE — 93005 ELECTROCARDIOGRAM TRACING: CPT | Performed by: EMERGENCY MEDICINE

## 2021-01-25 ASSESSMENT — ENCOUNTER SYMPTOMS
COUGH: 0
ABDOMINAL PAIN: 0
CHEST TIGHTNESS: 0
SORE THROAT: 0
SHORTNESS OF BREATH: 0
HEADACHES: 0
CHILLS: 0
DIAPHORESIS: 0
DIFFICULTY URINATING: 0
BACK PAIN: 0
PALPITATIONS: 0
FEVER: 0
VOMITING: 0
LIGHT-HEADEDNESS: 0
NAUSEA: 0

## 2021-01-25 NOTE — TELEPHONE ENCOUNTER
"  Additional Information    Negative: Severe difficulty breathing (e.g., struggling for each breath, speaks in single words)    Negative: Passed out (i.e., fainted, collapsed and was not responding)    Negative: Visible sweat on face or sweat dripping down face    Negative: Sounds like a life-threatening emergency to the triager    Negative: Followed an injury to chest    Negative: SEVERE chest pain    Negative: Pain also present in shoulder(s) or arm(s) or jaw    Negative: Cocaine use within last 3 days    Negative: History of prior 'blood clot' in leg or lungs (i.e., deep vein thrombosis, pulmonary embolism)    Negative: Recent illness requiring prolonged bed rest (i.e., immobilization)    Negative: Hip or leg fracture in past 2 months (e.g, or had cast on leg or ankle)    Negative: Major surgery in the past month    Negative: Recent long-distance travel with prolonged time in car, bus, plane, or train (i.e., within past 2 weeks; 6 or more hours duration)    Negative: Heart beating irregularly or very rapidly    Chest pain lasting longer than 5 minutes    Dizziness or lightheadedness    Intermittent chest pain and pain has been increasing in severity or frequency    Negative: Chest pain lasting longer than 5 minutes and ANY of the following:* Over 50 years old* Over 30 years old and at least one cardiac risk factor (i.e., high blood pressure, diabetes, high cholesterol, obesity, smoker or strong family history of heart disease)* Pain is crushing, pressure-like, or heavy * Took nitroglycerin and chest pain was not relieved* History of heart disease (i.e., angina, heart attack, bypass surgery, angioplasty, CHF)    Negative: Difficulty breathing    Answer Assessment - Initial Assessment Questions  1. LOCATION: \"Where does it hurt?\"        Tightness and dull pain, right in the middle of chest and across chest  2. RADIATION: \"Does the pain go anywhere else?\" (e.g., into neck, jaw, arms, back)      denies  3. ONSET: " "\"When did the chest pain begin?\" (Minutes, hours or days)       Since October, off and on, sporadic  4. PATTERN \"Does the pain come and go, or has it been constant since it started?\"  \"Does it get worse with exertion?\"       Yes, comes and goes.  Exertion does not seem to make it worse  5. DURATION: \"How long does it last\" (e.g., seconds, minutes, hours)      Varies.  Minutes  6. SEVERITY: \"How bad is the pain?\"  (e.g., Scale 1-10; mild, moderate, or severe)     - MILD (1-3): doesn't interfere with normal activities      - MODERATE (4-7): interferes with normal activities or awakens from sleep     - SEVERE (8-10): excruciating pain, unable to do any normal activities        mild  7. CARDIAC RISK FACTORS: \"Do you have any history of heart problems or risk factors for heart disease?\" (e.g., prior heart attack, angina; high blood pressure, diabetes, being overweight, high cholesterol, smoking, or strong family history of heart disease)      Denies known heart problems, h/o high cholesterol and not on medication  Not a smoker  8. PULMONARY RISK FACTORS: \"Do you have any history of lung disease?\"  (e.g., blood clots in lung, asthma, emphysema, birth control pills)      denies  9. CAUSE: \"What do you think is causing the chest pain?\"      Pt says he is a \"nervous person\" and believes it may be anxiety  10. OTHER SYMPTOMS: \"Do you have any other symptoms?\" (e.g., dizziness, nausea, vomiting, sweating, fever, difficulty breathing, cough)        Occasionally light-headed and dizzy.   Denies nausea.    Upset stomach off and on.  Denies burping.    11. PREGNANCY: \"Is there any chance you are pregnant?\" \"When was your last menstrual period?\"        NA    Protocols used: CHEST PAIN-A-OH    "

## 2021-01-25 NOTE — ED PROVIDER NOTES
"  History     Chief Complaint   Patient presents with     Chest Pain     HPI  Too Osman is a 50 year old male with history of hypertriglyceridemia who presents for evaluation of chest pain.  No known history of hypertension, diabetes, or family history of early coronary artery disease.  Patient is not a smoker.  Symptoms of been intermittent and ongoing for the past 3 months.  States he has approximately 3-4 episodes per week and that has been a fairly consistent pattern.  Describes the sensation as \"fullness\" or \"anxiousness or anxiety in my chest\".  Substernal in location.  2-4 in severity.  Not pleuritic.  Lasts from 30 minutes to a few hours.  No known provocative or palliating features.  Does seem to be more frequent when sitting up and less bothersome when he is \"up and about\".  Patient works as a  and has a physically demanding job.  Symptoms do not worsen when working.  Not associate with nausea, vomiting, or diaphoresis.  Otherwise feeling well.  Not currently on any medications.  No cough, sore throat, headache, abdominal pain, or diarrhea.  No pain or swelling in his lower extremities.  No known personal or family history of VTE.  Has some family members with anxiety and depression which he has recently become aware of.  He wonders if anxiety could be a component.    The patient's PMHx, Surgical Hx, Allergies, and Medications were all reviewed with the patient.    Allergies:  Allergies   Allergen Reactions     Shellfish-Derived Products Other (See Comments)     Not sure, but thinks this may cause throat problems       Problem List:    Patient Active Problem List    Diagnosis Date Noted     Hypertriglyceridemia 08/22/2018     Priority: Medium        Past Medical History:    No past medical history on file.    Past Surgical History:    No past surgical history on file.    Family History:    Family History   Problem Relation Age of Onset     Arthritis Father         gout     " Other Cancer Father         ??melanoma     Dementia Maternal Grandfather      Eye Disorder Paternal Grandmother         mac degen     Hypertension Paternal Grandmother        Social History:  Marital Status:   [2]  Social History     Tobacco Use     Smoking status: Never Smoker     Smokeless tobacco: Current User     Types: Chew     Tobacco comment: tin lasts 4 days   Substance Use Topics     Alcohol use: Yes     Alcohol/week: 0.0 standard drinks     Comment: 4-6 per week     Drug use: Never        Medications:    No current outpatient medications on file.        Review of Systems   Constitutional: Negative for chills, diaphoresis and fever.   HENT: Negative for congestion and sore throat.    Eyes: Negative for visual disturbance.   Respiratory: Negative for cough, chest tightness and shortness of breath.    Cardiovascular: Positive for chest pain. Negative for palpitations and leg swelling.   Gastrointestinal: Negative for abdominal pain, nausea and vomiting.   Genitourinary: Negative for difficulty urinating.   Musculoskeletal: Negative for back pain.   Skin: Negative for pallor.   Neurological: Negative for light-headedness and headaches.       Physical Exam   BP: (!) 178/98  Pulse: 97  Temp: 97.8  F (36.6  C)  Resp: 20  Weight: 88.5 kg (195 lb)  SpO2: 98 %    Physical Exam  GEN: Awake, alert, and cooperative. No acute distress.  HENT: MMM. External ears and nose normal bilaterally.  EYES: EOM intact. Conjunctiva clear.   NECK: Symmetric, freely mobile.   CV : Regular rate and rhythm.  No murmurs appreciated.  No JVD.  Symmetric radial pulses.  PULM: Normal effort. No wheezes, rales, or rhonchi bilaterally.  ABD: Soft, non-tender, non-distended. No rebound or guarding.   NEURO: Normal speech. Following commands. Answering questions and interacting appropriately.   EXT: No gross deformity. Warm and well perfused  INT: Warm. No diaphoresis. Normal color.        ED Course        Procedures  Results for orders  placed during the hospital encounter of 01/25/21   POC US ECHO LIMITED    Impression [facility] Procedure Note      Limited Bedside ED Cardiac Ultrasound:    PROCEDURE: PERFORMED BY: Dr. Db Morrell MD  INDICATIONS/SYMPTOM:  Chest Pain  PROBE: Cardiac phased array probe  BODY LOCATION: Chest  FINDINGS:   The ultrasound was performed utilizing the subcostal, parasternal long axis and parasternal short axis views.  Cardiac contractility:  Present  Gross estimation of cardiac kinesis: normal  Pericardial Effusion:  None  RV:LV ratio: LV > RV  IVC: not identified    INTERPRETATION:    Chamber size and motion were grossly normal with LV > RV, normal cardiac kinesis.  No pericardial effusion was found.     IMAGE DOCUMENTATION: Images were archived to PACs system.                   EKG Interpretation:      Interpreted by Db Morrell MD  Time reviewed: 1625  Symptoms at time of EKG: CP   Rhythm: normal sinus   Rate: normal  Axis: normal  Ectopy: none  Conduction: normal  ST Segments/ T Waves: No ST-T wave changes  Q Waves: none  Comparison to prior: No old EKG available    Clinical Impression: normal EKG        Critical Care time:  none               Results for orders placed or performed during the hospital encounter of 01/25/21 (from the past 24 hour(s))   Basic metabolic panel   Result Value Ref Range    Sodium 136 133 - 144 mmol/L    Potassium 3.8 3.4 - 5.3 mmol/L    Chloride 105 94 - 109 mmol/L    Carbon Dioxide 27 20 - 32 mmol/L    Anion Gap 4 3 - 14 mmol/L    Glucose 115 (H) 70 - 99 mg/dL    Urea Nitrogen 18 7 - 30 mg/dL    Creatinine 1.12 0.66 - 1.25 mg/dL    GFR Estimate 75 >60 mL/min/[1.73_m2]    GFR Estimate If Black 87 >60 mL/min/[1.73_m2]    Calcium 9.3 8.5 - 10.1 mg/dL   CBC with platelets differential   Result Value Ref Range    WBC 8.0 4.0 - 11.0 10e9/L    RBC Count 4.65 4.4 - 5.9 10e12/L    Hemoglobin 14.9 13.3 - 17.7 g/dL    Hematocrit 42.1 40.0 - 53.0 %    MCV 91 78 - 100 fl     MCH 32.0 26.5 - 33.0 pg    MCHC 35.4 31.5 - 36.5 g/dL    RDW 12.0 10.0 - 15.0 %    Platelet Count 203 150 - 450 10e9/L    Diff Method Automated Method     % Neutrophils 66.4 %    % Lymphocytes 26.6 %    % Monocytes 6.3 %    % Eosinophils 0.3 %    % Basophils 0.3 %    % Immature Granulocytes 0.1 %    Nucleated RBCs 0 0 /100    Absolute Neutrophil 5.3 1.6 - 8.3 10e9/L    Absolute Lymphocytes 2.1 0.8 - 5.3 10e9/L    Absolute Monocytes 0.5 0.0 - 1.3 10e9/L    Absolute Eosinophils 0.0 0.0 - 0.7 10e9/L    Absolute Basophils 0.0 0.0 - 0.2 10e9/L    Abs Immature Granulocytes 0.0 0 - 0.4 10e9/L    Absolute Nucleated RBC 0.0    Troponin I   Result Value Ref Range    Troponin I ES <0.015 0.000 - 0.045 ug/L   POC US ECHO LIMITED    Impression    [facility] Procedure Note      Limited Bedside ED Cardiac Ultrasound:    PROCEDURE: PERFORMED BY: Dr. Db Morrell MD  INDICATIONS/SYMPTOM:  Chest Pain  PROBE: Cardiac phased array probe  BODY LOCATION: Chest  FINDINGS:   The ultrasound was performed utilizing the subcostal, parasternal long axis and parasternal short axis views.  Cardiac contractility:  Present  Gross estimation of cardiac kinesis: normal  Pericardial Effusion:  None  RV:LV ratio: LV > RV  IVC: not identified    INTERPRETATION:    Chamber size and motion were grossly normal with LV > RV, normal cardiac kinesis.  No pericardial effusion was found.     IMAGE DOCUMENTATION: Images were archived to PACs system.         Chest XR,  PA & LAT    Narrative    XR CHEST TWO VIEWS   1/25/2021 5:45 PM     HISTORY: Substernal chest pain. Intermittent. No fever or cough.    COMPARISON: None available      Impression    IMPRESSION: PA and lateral views of the chest were obtained.  Cardiomediastinal silhouette is within normal limits. No suspicious  focal pulmonary opacities. No significant pleural effusion or  pneumothorax.       Medications - No data to display    Assessments & Plan (with Medical Decision Making)   50  year old male with cardiac risk factors of hypertriglyceridemia, with 3 months of intermittent substernal chest pain which is nonexertional or associated with dyspnea, nausea, or diaphoresis.  ECG without any evidence of acute ischemia.  No prior ECG for comparison.  Overall I have a low suspicion for ACS and patient's heart score is 2.  Her symptoms to be 3-4 times per week for 3 months, a elevation of troponin would be likely at this time although ever not certain due to the intermittent symptoms.  No hypoxia, shortness of breath, pleuritic chest pain, clinical signs of DVT.  Very low suspicion for VTE and did not feel any further work-up warranted at this time.  No cough, hypoxia, fever to suggest pneumonia.  Chest x-ray without acute infiltrate, effusion, or pneumothorax. Images reviewed personally as well as report from radiology which is detailed above. Point-of-care ultrasound shows grossly preserved cardiac function and no pericardial effusion.  Given his reassuring work-up I feel he is appropriate for outpatient management and possible further cardiac risk ratification.  We discussed importance of follow-up and ED return precautions.  He expresses agreement understanding of plan and discharged in stable condition.    I have reviewed the nursing notes.         HEART Score  Background  Calculates the overall risk of adverse event in patient's presenting with chest pain.  Based on 5 criteria (each assigned 0-2 points) including suspiciousness of history, EKG, age, risk factors and troponin.    Data  50 year old male  has Hypertriglyceridemia on their problem list.   reports that he has never smoked. His smokeless tobacco use includes chew.  family history includes Arthritis in his father; Dementia in his maternal grandfather; Eye Disorder in his paternal grandmother; Hypertension in his paternal grandmother; Other Cancer in his father.  Lab Results   Component Value Date    TROPI <0.015 01/25/2021      Criteria   0-2 points for each of 5 items (maximum of 10 points):  Score 0- History slightly suspicious for coronary syndrome  Score 0- EKG Normal  Score 1- Age 45 to 65 years old  Score 1- One to 2 risk factors for atherosclerotic disease  Score 0- Within normal limits for troponin levels  Interpretation  Risk of adverse outcome  Heart Score: 2  Total Score 0-3- Adverse Outcome Risk 2.5% - Supports early discharge with appropriate follow-up        New Prescriptions    No medications on file       Final diagnoses:   Chest pain, unspecified type     Db Morrell MD    1/25/2021   [facility] EMERGENCY DEPT    Disclaimer: This note consists of words and symbols derived from keyboarding and dictation using voice recognition software.  As a result, there may be errors that have gone undetected.  Please consider this when interpreting information found in this note.             Db Morrell MD  01/25/21 [id number]

## 2021-01-25 NOTE — TELEPHONE ENCOUNTER
Reason for Call:  Other call back    Detailed comments: Patient called and says he has not been feeling well lately and has tightness in his chest for 3 months now and not sure what it is maybe anxiety.  Asking for a call back to see if he should go to urgent care.    Phone Number Patient can be reached at: Cell number on file:    Telephone Information:   Mobile [phone]       Best Time:     Can we leave a detailed message on this number? YES    Call taken on 1/25/2021 at 2:49 PM by Fallon Beebe

## 2021-01-26 DIAGNOSIS — Z13.1 SCREENING FOR DIABETES MELLITUS: ICD-10-CM

## 2021-01-26 DIAGNOSIS — E78.1 HYPERTRIGLYCERIDEMIA: ICD-10-CM

## 2021-01-26 DIAGNOSIS — Z12.5 SCREENING FOR PROSTATE CANCER: ICD-10-CM

## 2021-01-26 LAB
CHOLEST SERPL-MCNC: 222 MG/DL
GLUCOSE SERPL-MCNC: 103 MG/DL (ref 70–99)
HDLC SERPL-MCNC: 53 MG/DL
LDLC SERPL CALC-MCNC: 137 MG/DL
NONHDLC SERPL-MCNC: 169 MG/DL
PSA SERPL-ACNC: 0.26 UG/L (ref 0–4)
TRIGL SERPL-MCNC: 159 MG/DL

## 2021-01-26 PROCEDURE — 80061 LIPID PANEL: CPT | Performed by: FAMILY MEDICINE

## 2021-01-26 PROCEDURE — G0103 PSA SCREENING: HCPCS | Performed by: FAMILY MEDICINE

## 2021-01-26 PROCEDURE — 82947 ASSAY GLUCOSE BLOOD QUANT: CPT | Performed by: FAMILY MEDICINE

## 2021-01-26 PROCEDURE — 36415 COLL VENOUS BLD VENIPUNCTURE: CPT | Performed by: FAMILY MEDICINE

## 2021-01-26 NOTE — DISCHARGE INSTRUCTIONS
Your evaluation emergency department is reassuring however no definitive cause of your symptoms was identified.  Will be important that you follow-up with your primary care provider for further evaluation.  If your symptoms worsen, or he develop new or concerning symptoms, please return to the emergency department immediately for further evaluation and treatment.

## 2021-02-08 ENCOUNTER — OFFICE VISIT (OUTPATIENT)
Dept: FAMILY MEDICINE | Facility: CLINIC | Age: 51
End: 2021-02-08
Payer: COMMERCIAL

## 2021-02-08 VITALS
HEIGHT: 72 IN | BODY MASS INDEX: 24.95 KG/M2 | RESPIRATION RATE: 16 BRPM | TEMPERATURE: 98.4 F | OXYGEN SATURATION: 98 % | HEART RATE: 96 BPM | DIASTOLIC BLOOD PRESSURE: 68 MMHG | WEIGHT: 184.2 LBS | SYSTOLIC BLOOD PRESSURE: 130 MMHG

## 2021-02-08 DIAGNOSIS — F41.9 ANXIETY: ICD-10-CM

## 2021-02-08 DIAGNOSIS — Z72.0 TOBACCO ABUSE: ICD-10-CM

## 2021-02-08 DIAGNOSIS — Z00.00 ROUTINE GENERAL MEDICAL EXAMINATION AT A HEALTH CARE FACILITY: Primary | ICD-10-CM

## 2021-02-08 PROCEDURE — 99213 OFFICE O/P EST LOW 20 MIN: CPT | Mod: 25 | Performed by: FAMILY MEDICINE

## 2021-02-08 PROCEDURE — 99396 PREV VISIT EST AGE 40-64: CPT | Performed by: FAMILY MEDICINE

## 2021-02-08 ASSESSMENT — ANXIETY QUESTIONNAIRES
6. BECOMING EASILY ANNOYED OR IRRITABLE: SEVERAL DAYS
IF YOU CHECKED OFF ANY PROBLEMS ON THIS QUESTIONNAIRE, HOW DIFFICULT HAVE THESE PROBLEMS MADE IT FOR YOU TO DO YOUR WORK, TAKE CARE OF THINGS AT HOME, OR GET ALONG WITH OTHER PEOPLE: SOMEWHAT DIFFICULT
2. NOT BEING ABLE TO STOP OR CONTROL WORRYING: MORE THAN HALF THE DAYS
1. FEELING NERVOUS, ANXIOUS, OR ON EDGE: SEVERAL DAYS
5. BEING SO RESTLESS THAT IT IS HARD TO SIT STILL: NOT AT ALL
GAD7 TOTAL SCORE: 9
7. FEELING AFRAID AS IF SOMETHING AWFUL MIGHT HAPPEN: SEVERAL DAYS
3. WORRYING TOO MUCH ABOUT DIFFERENT THINGS: MORE THAN HALF THE DAYS

## 2021-02-08 ASSESSMENT — PATIENT HEALTH QUESTIONNAIRE - PHQ9
SUM OF ALL RESPONSES TO PHQ QUESTIONS 1-9: 3
5. POOR APPETITE OR OVEREATING: MORE THAN HALF THE DAYS

## 2021-02-08 ASSESSMENT — MIFFLIN-ST. JEOR: SCORE: 1715.59

## 2021-02-08 NOTE — PROGRESS NOTES
SUBJECTIVE:   CC: Too Osman is an 50 year old male who presents for preventative health visit.     Patient has been advised of split billing requirements and indicates understanding: Yes  Healthy Habits:    Getting at least 3 servings of Calcium per day:  NO    Bi-annual eye exam:  Yes    Dental care twice a year:  Yes    Sleep apnea or symptoms of sleep apnea:  None    Diet:  Regular (no restrictions)    Frequency of exercise:  None (Patient does have an active job. .)    Duration of exercise:  N/A    Taking medications regularly:  Not Applicable    Barriers to taking medications:  Not applicable    Medication side effects:  Not applicable    PHQ-2 Total Score:    Additional concerns today:  Yes (Anxiety. Does have a family history. Was just in th ER for chest tightness but they told him it was anxiety.)    If time:  Abnormal Mood Symptoms  Onset: Hasn't felt like he has had anxiety but patient states he is quite and doesn't like crowds. He says he is an introvert. Family members take Paxil.    Description:   Depression: no  Anxiety: YES- possibly. He states his mind is always thinking and going.    Accompanying Signs & Symptoms:  Still participating in activities that you used to enjoy: YES  Fatigue: YES- but has also changed his schedule.  Irritability: no  Difficulty concentrating: no  Changes in appetite: no  Problems with sleep: no  Heart racing/beating fast : no  Thoughts of hurting yourself or others: none    History:   Recent stress: YES- 2 puppies, son moved home. Busy chaotic house.   Prior depression hospitalization: None  Family history of depression: no  Family history of anxiety: YES    Precipitating factors:   Alcohol/drug use: no    Alleviating factors:  no    Therapies Tried and outcome: Nothing    Today's PHQ-2 Score:   PHQ-2 ( 1999 [employer]) 8/22/2018   Q1: Little interest or pleasure in doing things 0   Q2: Feeling down, depressed or hopeless 0   PHQ-2 Score 0       Abuse:  "Current or Past(Physical, Sexual or Emotional)- No  Do you feel safe in your environment? Yes    Component      Latest Ref Rng & Units 1/26/2021   Cholesterol      <200 mg/dL 222 (H)   Triglycerides      <150 mg/dL 159 (H)   HDL Cholesterol      >39 mg/dL 53   LDL Cholesterol Calculated      <100 mg/dL 137 (H)   Non HDL Cholesterol      <130 mg/dL 169 (H)   PSA      0 - 4 ug/L 0.26   Glucose      70 - 99 mg/dL 103 (H)   Reviewed labs with patient and risk for ascvd.    Social History     Tobacco Use     Smoking status: Never Smoker     Smokeless tobacco: Current User     Types: Chew     Tobacco comment: tin lasts 4 days   Substance Use Topics     Alcohol use: Yes     Alcohol/week: 0.0 standard drinks     Comment: 4-6 per week     If you drink alcohol do you typically have >3 drinks per day or >7 drinks per week? No    No flowsheet data found.    Last PSA:   PSA   Date Value Ref Range Status   01/26/2021 0.26 0 - 4 ug/L Final     Comment:     Assay Method:  Chemiluminescence using Siemens Vista analyzer       Reviewed orders with patient. Reviewed health maintenance and updated orders accordingly - Yes      Reviewed and updated as needed this visit by clinical staff  Tobacco  Allergies  Meds  Problems  Med Hx  Surg Hx  Fam Hx  Soc Hx          Reviewed and updated as needed this visit by Provider  Tobacco  Allergies  Meds  Problems  Med Hx  Surg Hx  Fam Hx             Review of Systems  Review Of Systems  Skin: negative  Eyes: negative  Ears/Nose/Throat: negative  Respiratory: No shortness of breath, dyspnea on exertion, cough, or hemoptysis  Cardiovascular: negative  Gastrointestinal: negative  Genitourinary: negative  Musculoskeletal: negative  Neurologic: negative  Psychiatric: as above  Hematologic/Lymphatic/Immunologic: negative  Endocrine: negative      OBJECTIVE:   /68   Pulse 96   Temp 98.4  F (36.9  C) (Tympanic)   Resp 16   Ht 1.816 m (5' 11.5\")   Wt 83.6 kg (184 lb 3.2 oz)   SpO2 " "98%   BMI 25.33 kg/m      Physical Exam  GENERAL: healthy, alert and no distress  EYES: Eyes grossly normal to inspection, PERRL and conjunctivae and sclerae normal  HENT: ear canals and TM's normal, nose and mouth without ulcers or lesions  NECK: no adenopathy, no asymmetry, masses, or scars and thyroid normal to palpation  RESP: lungs clear to auscultation - no rales, rhonchi or wheezes  CV: regular rate and rhythm, normal S1 S2, no S3 or S4, no murmur, click or rub, no peripheral edema and peripheral pulses strong  ABDOMEN: soft, nontender, no hepatosplenomegaly, no masses and bowel sounds normal   (male): normal male genitalia without lesions or urethral discharge, no hernia  MS: no gross musculoskeletal defects noted, no edema  SKIN: no suspicious lesions or rashes  NEURO: Normal strength and tone, mentation intact and speech normal  PSYCH: mentation appears normal, affect normal/bright  LYMPH: anterior cervical: no adenopathy  posterior cervical: no adenopathy        ASSESSMENT/PLAN:   (Z00.00) Routine general medical examination at a health care facility  (primary encounter diagnosis)  Comment: Discussed healthy lifestyle and preventative cares.    Plan:     (F41.9) Anxiety  Comment: he will work on lifestyle changes, information is given.  will check in 4 weeks to see how he is doing to see if counseling and or medication with ssri is needed.  Discussed meds and counseling in detail.  Plan:   Patient has been advised of split billing requirements and indicates understanding: Yes  COUNSELING:   Reviewed preventive health counseling, as reflected in patient instructions       Regular exercise       Healthy diet/nutrition       Colon cancer screening       Prostate cancer screening    Estimated body mass index is 25.33 kg/m  as calculated from the following:    Height as of this encounter: 1.816 m (5' 11.5\").    Weight as of this encounter: 83.6 kg (184 lb 3.2 oz).         He reports that he has never " smoked. His smokeless tobacco use includes chew.  Tobacco Cessation Action Plan:   Information offered: Patient not interested at this time      Counseling Resources:  ATP IV Guidelines  Pooled Cohorts Equation Calculator  FRAX Risk Assessment  ICSI Preventive Guidelines  Dietary Guidelines for Americans, 2010  USDA's MyPlate  ASA Prophylaxis  Lung CA Screening    Raoul Kaufman MD  [facility]

## 2021-02-08 NOTE — PATIENT INSTRUCTIONS
The 10-year ASCVD risk score (Sabine SANDS Jr., et al., 2013) is: 4.7%    Values used to calculate the score:      Age: 50 years      Sex: Male      Is Non- : No      Diabetic: No      Tobacco smoker: No      Systolic Blood Pressure: 130 mmHg      Is BP treated: No      HDL Cholesterol: 53 mg/dL      Total Cholesterol: 222 mg/dL      Please be aware that there will be an additional charge during your preventative visit due to either a new diagnosis and/or chronic disease management.    Preventative visits screen for diseases prior to they occur.  They do not cover for any new diagnosis or chronic disease management which would include medication refills, labs etc.    If you have questions regarding your coverage please check with your insurance provider.  At [facility] we need to code correctly to be in compliance with all insurance companies.    Please follow in 4 weeks to see about your anxiety.      Thank you for choosing [facility].  You may be receiving an email and/or telephone survey request from [facility] [employer] regarding your visit today.  Please take a few minutes to respond to the survey to let us know how we are doing.      If you have questions or concerns, please contact us via [employer] or you can contact your care team at [phone].    Our Clinic hours are:  Monday 6:40 am  to 7:00 pm  Tuesday -Friday 6:40 am to 5:00 pm    The [place] outpatient lab hours are:  Monday - Friday 6:10 am to 4:45 pm  Saturdays 7:00 am to 11:00 am  Appointments are required, call [phone]    If you have clinical questions after hours or would like to schedule an appointment,  call the clinic at [phone].    Preventive Health Recommendations  Male Ages 50 - 64    Yearly exam:             See your health care provider every year in order to  o   Review health changes.   o   Discuss preventive care.    o   Review your medicines if your doctor has prescribed any.     Have a  cholesterol test every 5 years, or more frequently if you are at risk for high cholesterol/heart disease.     Have a diabetes test (fasting glucose) every three years. If you are at risk for diabetes, you should have this test more often.     Have a colonoscopy at age 50, or have a yearly FIT test (stool test). These exams will check for colon cancer.      Talk with your health care provider about whether or not a prostate cancer screening test (PSA) is right for you.    You should be tested each year for STDs (sexually transmitted diseases), if you re at risk.     Shots: Get a flu shot each year. Get a tetanus shot every 10 years.     Nutrition:    Eat at least 5 servings of fruits and vegetables daily.     Eat whole-grain bread, whole-wheat pasta and brown rice instead of white grains and rice.     Get adequate Calcium and Vitamin D.     Lifestyle    Exercise for at least 150 minutes a week (30 minutes a day, 5 days a week). This will help you control your weight and prevent disease.     Limit alcohol to one drink per day.     No smoking.     Wear sunscreen to prevent skin cancer.     See your dentist every six months for an exam and cleaning.     See your eye doctor every 1 to 2 years.  Anxiety Reaction  Anxiety is the feeling we all get when we think something bad might happen. It is a normal response to stress and normally causes only a mild reaction. When anxiety becomes more severe, it can interfere with daily life. In some cases, you may not even be aware of what you re anxious about. There may also be a genetic link. Or it may be a learned behavior in the home.   Both psychological and physical triggers cause stress reaction. It's often a response to fear or emotional stress, real or imagined. This stress may come from home, family, work, or social relationships.   During an anxiety reaction, you may feel:    Helpless    Nervous    Depressed    Grouchy  Your body may show signs of anxiety in many ways. You  may experience:    Dry mouth    Shakiness    Dizziness    Weakness    Trouble breathing    Breathing fast (hyperventilating)    Chest pressure    Sweating    Headache    Nausea    Diarrhea    Tiredness    Inability to sleep    Sexual problems  Home care    Try to find the sources of stress in your life. They may not be obvious. These may include:  ? Daily hassles of life (such as traffic jams, missed appointments, or car troubles)  ? Major life changes, both good (new baby or job promotion) and bad (loss of job or loss of loved one)  ? Overload (feeling that you have too many responsibilities and can't take care of all of them at once)  ? Feeling helpless or feeling that your problems can't be solved    Notice how your body reacts to stress. Learn to listen to your body signals. This will help you take action before the stress becomes severe.    When you can, do something about the source of your stress. (Avoid hassles, limit the amount of change that happens in your life at one time, and take a break when you feel overloaded).    Unfortunately, many stressful situations can't be avoided. It is necessary to learn how to better manage stress. There are many proven methods that will reduce your anxiety. These include simple things such as exercise, good nutrition, and adequate rest. Also, there are certain techniques that are helpful:  ? Relaxation  ? Breathing exercises  ? Visualization  ? Biofeedback  ? Meditation  For more information about this, talk with your healthcare provider. Or check online or at your local library or bookstore. You'll find many books and audiobooks on this subject.   Follow-up care  If you feel your anxiety is not responding to self-help measures, call your healthcare provider or make an appointment with a counselor. You may need short-term psychological counseling or medicine to help you manage stress.   Call 911  Call 911 if any of these happen:     Trouble  breathing    Confusion    Drowsiness or trouble waking up    Fainting or loss of consciousness    Rapid heart rate    Seizure    New chest pain that becomes more severe, lasts longer, or spreads into your shoulder, arm, neck, jaw, or back  When to get medical advice  Call your healthcare provider right away if any of these happen:    Your symptoms get worse    Severe headache not eased by rest and mild pain reliever  [employer] last reviewed this educational content on 4/1/2020 2000-2020 [employer]. [address] [place] [address], [place] [address]. All rights reserved. This information is not intended as a substitute for professional medical care. Always follow your healthcare professional's instructions.          Understanding Anxiety Disorders  Almost everyone gets nervous now and then. It s normal to have knots in your stomach before a test. Or for your heart to beat fast on a first date. But an anxiety disorder is much more than a case of nerves. In fact, its symptoms may be overwhelming. But treatment can ease many of these symptoms. Talking with your healthcare provider is the first step.     What are anxiety disorders?  An anxiety disorder causes very strong feelings of panic and fear. These feelings may occur for no clear reason. And they tend to happen again and again. They may prevent you from coping with life. They may cause you great distress. You may then stay away from anything that triggers your fear. In extreme cases, you may never leave the house. Anxiety disorders may cause other symptoms, such as:     Obsessive thoughts that are unwanted and you can t control    Constant nightmares or painful thoughts of the past    Upset stomach, sweating, and muscle tension    Trouble sleeping or focusing  What causes anxiety disorders?  Anxiety disorders tend to run in families. For some people, childhood abuse or neglect may play a role. For others, stressful life events or trauma may trigger these  disorders. Anxiety can trigger low self-esteem and poor coping skills.   Types of anxiety disorders    Panic disorder. This causes a very strong fear of being in danger.    Phobias. These are extreme fears of certain things, places, or events.    Obsessive-compulsive disorder (OCD). This makes you have unwanted thoughts and urges. You also may do certain things over and over.    Posttraumatic stress disorder (PTSD). This occurs in people who have been through a terrible ordeal. It can cause nightmares and flashbacks about the event.    Generalized anxiety disorder. This causes constant worry. It can greatly disrupt your life.     Getting better  You may believe that nothing can help you. Or, you might fear what others may think. But most anxiety symptoms can be eased. Having an anxiety disorder is nothing to be ashamed of. Most people do best with treatment that combines medicine and individual and group therapy. These aren t cures. But they can help you live a healthier life.   [employer] last reviewed this educational content on 3/1/2020    2344-6743 [employer]. [address] [place] [address]. All rights reserved. This information is not intended as a substitute for professional medical care. Always follow your healthcare professional's instructions.     Treating Anxiety Disorders with Therapy    If you have an anxiety disorder, you don t have to suffer needlessly. Treatment is available. Therapy (also called counseling) is often a helpful treatment for anxiety disorders. With therapy, a trained professional (therapist) helps you face and learn to manage your anxiety. Therapy can be short-term or long-term based on your needs. In some cases, medicine may also be prescribed with therapy. It may take time before you notice how much therapy is helping, but stick with it. With therapy, you can feel better.   Cognitive behavioral therapy (CBT)  Cognitive behavioral therapy (CBT) teaches you to  manage anxiety. It does this by helping you understand how you think and act when you re anxious. Research has shown CBT to be a very effective treatment for anxiety disorders. CBT involves homework and activities to build skills that teach you to cope with anxiety step by step. It can be done in a group or 1-on-1, and often takes place for a set number of sessions. CBT has 2 main parts:     Cognitive therapy. This helps you identify the negative, irrational thoughts that occur with your anxiety. You ll learn to replace these with more positive, realistic thoughts.    Behavioral therapy. This helps you change how you react to anxiety. You ll learn coping skills and methods for relaxing to help you better deal with anxiety.  Other forms of therapy  Other therapy methods may work better for you than CBT. Or, you may move from CBT to another form of therapy as your treatment needs change. This may mean meeting with a therapist by yourself or in a group. Therapy can also help you work through problems in your life, such as drug or alcohol dependence, that may be making your anxiety worse.   Getting better takes time  Therapy will help you feel better and teach you skills to help manage anxiety long term. But change doesn t happen right away. It takes a commitment from you. And treatment only works if you learn to face the causes of your anxiety. So, you might feel worse before you feel better. This can sometimes make it hard to stick with it. But remember: Therapy is a very effective treatment. The results will be well worth it.   Helping yourself  If anxiety is wearing you down, here are some things you can do to cope:    Check with your healthcare provider and rule out any physical problems that may be causing the anxiety symptoms.    If you are diagnosed with an anxiety disorder, seek mental healthcare. This is an illness and it can respond to treatment. Most types of anxiety disorders will respond to talk therapy  and medicine.    Educate yourself about anxiety disorders. Keep track of helpful online resources and books you can use during stressful periods.    Try stress management methods such as meditation.    Consider online or in-person support groups.    Don t fight your feelings. Anxiety feeds itself. The more you worry about it, the worse it gets. Instead, try to identify what might have triggered your anxiety. Then try to put this threat in perspective.    Keep in mind that you can t control everything about a situation. Change what you can and let the rest take its course.    Exercise -- it s a great way to relieve tension and help your body feel relaxed.    Examine your life for stress, and try to find ways to reduce it.    Avoid caffeine and nicotine. These can make anxiety symptoms worse.    Don't turn to alcohol or unprescribed medicines for relief. They only make things worse in the long run.  [employer] last reviewed this educational content on 5/1/2020 2000-2020 [employer]. [address] [place] [address]. All rights reserved. This information is not intended as a substitute for professional medical care. Always follow your healthcare professional's instructions.          Treating Anxiety Disorders with Medicine  An anxiety disorder can make you feel nervous or apprehensive, even without a clear reason. In people age 65 and older, generalized anxiety disorder is one of the most commonly diagnosed anxiety disorders. Many times it occurs with depression. Certain anxiety disorders can cause intense feelings of fear or panic. You may even have physical symptoms such as a racing heartbeat, sweating, or dizziness. If you have these feelings, you don t have to suffer anymore. Treatment to help you overcome your fears will likely include therapy (also called counseling). Medicine may also be prescribed to help control your symptoms.     Medicines  Certain medicines may be prescribed to help  control your symptoms. So you may feel less anxious. You may also feel able to move forward with therapy. At first, medicines and dosages may need to be adjusted to find what works best for you. Try to be patient. Tell your healthcare provider how a medicine makes you feel. This way, you can work together to find the treatment that s best for you. Keep in mind that medicines can have side effects. Talk with your provider about any side effects that are bothering you. Changing the dose or type of medicine may help. Don t stop taking medicine on your own. That can cause symptoms to come back or cause dangerous withdrawal symptoms.     Anti-anxiety medicine. This medicine eases symptoms and helps you relax. Your healthcare provider will explain when and how to use it. It may be prescribed for use before situations that make you anxious. You may also be told to take medicine on a regular schedule. Anti-anxiety medicine may make you feel a little sleepy or  out of it.  Don t drive a car or operate machinery while on this medicine, until you know how it affects you.  Never use alcohol or other drugs with anti-anxiety medicines. This could result in loss of muscular control, sedation, coma, or death. Also, use only the amount of medicine prescribed for you. If you think you may have taken too much, get emergency care right away. Never share your medicines with others. Store these medicines in a safe place that can't be reached by children or visitors.   Keep taking medicines as prescribed  Never change your dosage, share or use another person's medicine, or stop taking your medicines without talking to your healthcare provider first. Keep the following in mind:     Some medicines must be taken on a schedule. Make this part of your daily routine. For instance, always take your pill before brushing your teeth. A pillbox can help you remember if you ve taken your medicine each day.    Medicines are often taken for 6 to 12  months. Your healthcare provider will then evaluate whether you need to stay on them. Many people who have also had therapy may no longer need medicine to manage anxiety.    You may need to stop taking medicine slowly to give your body time to adjust. When it s time to stop, your healthcare provider will tell you more. Remember: Never stop taking your medicine without talking to your provider first.    If symptoms return, you may need to start taking medicines again.  This isn t your fault. It s just the nature of your anxiety disorder.  What to think about    Side effects. Medicines may cause side effects. Ask your healthcare provider or pharmacist what you can expect. They may have ideas for avoiding some side effects.    Sexual problems. Some antidepressants can affect your desire for sex or your ability to have an orgasm. A change in dosage or medicine often solves the problem. If you have a sexual side effect that concerns you, tell your healthcare provider.    Addiction. If you ve never had a problem with drugs or alcohol, you may not have a problem with medicines used to treat anxiety disorders. But always discuss the medicines with your healthcare provider before taking them. If you have a history of addiction, you may not be able to use certain medicines used to treat anxiety disorders.    Medicine interactions. Always check with your pharmacist before using any over-the-counter medicines (OTCs), including herbal supplements. Some OTCs may interact with your anti-anxiety medicines and increase or decrease their effectiveness.     [employer] last reviewed this educational content on 12/1/2019 2000-2020 [employer]. [address] [place] [address], [place] [address]. All rights reserved. This information is not intended as a substitute for professional medical care. Always follow your healthcare professional's instructions.

## 2021-02-09 ASSESSMENT — ANXIETY QUESTIONNAIRES: GAD7 TOTAL SCORE: 9

## 2021-03-02 ENCOUNTER — APPOINTMENT (OUTPATIENT)
Dept: GENERAL RADIOLOGY | Facility: CLINIC | Age: 51
End: 2021-03-02
Attending: FAMILY MEDICINE
Payer: COMMERCIAL

## 2021-03-02 ENCOUNTER — HOSPITAL ENCOUNTER (EMERGENCY)
Facility: CLINIC | Age: 51
Discharge: HOME | End: 2021-03-02
Attending: FAMILY MEDICINE | Admitting: FAMILY MEDICINE
Payer: COMMERCIAL

## 2021-03-02 VITALS
TEMPERATURE: 98.8 F | HEART RATE: 89 BPM | WEIGHT: 185 LBS | DIASTOLIC BLOOD PRESSURE: 92 MMHG | BODY MASS INDEX: 25.06 KG/M2 | HEIGHT: 72 IN | OXYGEN SATURATION: 98 % | SYSTOLIC BLOOD PRESSURE: 138 MMHG

## 2021-03-02 DIAGNOSIS — R10.9 ABDOMINAL PAIN, UNSPECIFIED ABDOMINAL LOCATION: ICD-10-CM

## 2021-03-02 LAB
ALBUMIN SERPL-MCNC: 4.3 G/DL (ref 3.4–5)
ALBUMIN UR-MCNC: NEGATIVE MG/DL
ALP SERPL-CCNC: 72 U/L (ref 40–150)
ALT SERPL W P-5'-P-CCNC: 30 U/L (ref 0–70)
ANION GAP SERPL CALCULATED.3IONS-SCNC: 6 MMOL/L (ref 3–14)
APPEARANCE UR: CLEAR
AST SERPL W P-5'-P-CCNC: 18 U/L (ref 0–45)
BASOPHILS # BLD AUTO: 0 10E9/L (ref 0–0.2)
BASOPHILS NFR BLD AUTO: 0.1 %
BILIRUB SERPL-MCNC: 0.6 MG/DL (ref 0.2–1.3)
BILIRUB UR QL STRIP: NEGATIVE
BUN SERPL-MCNC: 13 MG/DL (ref 7–30)
CALCIUM SERPL-MCNC: 9.3 MG/DL (ref 8.5–10.1)
CHLORIDE SERPL-SCNC: 106 MMOL/L (ref 94–109)
CO2 SERPL-SCNC: 27 MMOL/L (ref 20–32)
COLOR UR AUTO: ABNORMAL
CREAT SERPL-MCNC: 1.08 MG/DL (ref 0.66–1.25)
CRP SERPL-MCNC: <2.9 MG/L (ref 0–8)
DIFFERENTIAL METHOD BLD: NORMAL
EOSINOPHIL # BLD AUTO: 0 10E9/L (ref 0–0.7)
EOSINOPHIL NFR BLD AUTO: 0.3 %
ERYTHROCYTE [DISTWIDTH] IN BLOOD BY AUTOMATED COUNT: 11.9 % (ref 10–15)
GFR SERPL CREATININE-BSD FRML MDRD: 78 ML/MIN/{1.73_M2}
GLUCOSE SERPL-MCNC: 99 MG/DL (ref 70–99)
GLUCOSE UR STRIP-MCNC: NEGATIVE MG/DL
HCT VFR BLD AUTO: 44.7 % (ref 40–53)
HGB BLD-MCNC: 15.6 G/DL (ref 13.3–17.7)
HGB UR QL STRIP: NEGATIVE
IMM GRANULOCYTES # BLD: 0 10E9/L (ref 0–0.4)
IMM GRANULOCYTES NFR BLD: 0.3 %
KETONES UR STRIP-MCNC: NEGATIVE MG/DL
LEUKOCYTE ESTERASE UR QL STRIP: NEGATIVE
LIPASE SERPL-CCNC: 203 U/L (ref 73–393)
LYMPHOCYTES # BLD AUTO: 1.7 10E9/L (ref 0.8–5.3)
LYMPHOCYTES NFR BLD AUTO: 23.4 %
MCH RBC QN AUTO: 32.1 PG (ref 26.5–33)
MCHC RBC AUTO-ENTMCNC: 34.9 G/DL (ref 31.5–36.5)
MCV RBC AUTO: 92 FL (ref 78–100)
MONOCYTES # BLD AUTO: 0.5 10E9/L (ref 0–1.3)
MONOCYTES NFR BLD AUTO: 6.6 %
MUCOUS THREADS #/AREA URNS LPF: PRESENT /LPF
NEUTROPHILS # BLD AUTO: 5.1 10E9/L (ref 1.6–8.3)
NEUTROPHILS NFR BLD AUTO: 69.3 %
NITRATE UR QL: NEGATIVE
NRBC # BLD AUTO: 0 10*3/UL
NRBC BLD AUTO-RTO: 0 /100
PH UR STRIP: 6 PH (ref 5–7)
PLATELET # BLD AUTO: 210 10E9/L (ref 150–450)
POTASSIUM SERPL-SCNC: 3.9 MMOL/L (ref 3.4–5.3)
PROT SERPL-MCNC: 8 G/DL (ref 6.8–8.8)
RBC # BLD AUTO: 4.86 10E12/L (ref 4.4–5.9)
RBC #/AREA URNS AUTO: 0 /HPF (ref 0–2)
SODIUM SERPL-SCNC: 139 MMOL/L (ref 133–144)
SOURCE: ABNORMAL
SP GR UR STRIP: 1 (ref 1–1.03)
UROBILINOGEN UR STRIP-MCNC: 0 MG/DL (ref 0–2)
WBC # BLD AUTO: 7.3 10E9/L (ref 4–11)
WBC #/AREA URNS AUTO: <1 /HPF (ref 0–5)

## 2021-03-02 PROCEDURE — 81001 URINALYSIS AUTO W/SCOPE: CPT | Performed by: FAMILY MEDICINE

## 2021-03-02 PROCEDURE — 83690 ASSAY OF LIPASE: CPT | Performed by: FAMILY MEDICINE

## 2021-03-02 PROCEDURE — 96360 HYDRATION IV INFUSION INIT: CPT | Performed by: FAMILY MEDICINE

## 2021-03-02 PROCEDURE — 80053 COMPREHEN METABOLIC PANEL: CPT | Performed by: FAMILY MEDICINE

## 2021-03-02 PROCEDURE — 85025 COMPLETE CBC W/AUTO DIFF WBC: CPT | Performed by: FAMILY MEDICINE

## 2021-03-02 PROCEDURE — 74019 RADEX ABDOMEN 2 VIEWS: CPT

## 2021-03-02 PROCEDURE — 99284 EMERGENCY DEPT VISIT MOD MDM: CPT | Mod: 25 | Performed by: FAMILY MEDICINE

## 2021-03-02 PROCEDURE — 96361 HYDRATE IV INFUSION ADD-ON: CPT | Performed by: FAMILY MEDICINE

## 2021-03-02 PROCEDURE — 258N000003 HC RX IP 258 OP 636: Performed by: FAMILY MEDICINE

## 2021-03-02 PROCEDURE — 99284 EMERGENCY DEPT VISIT MOD MDM: CPT | Performed by: FAMILY MEDICINE

## 2021-03-02 PROCEDURE — 86140 C-REACTIVE PROTEIN: CPT | Performed by: FAMILY MEDICINE

## 2021-03-02 RX ORDER — BACILLUS COAGULANS/VITAMIN D3 2B-5 MCG
1 TABLET,CHEWABLE ORAL DAILY PRN
COMMUNITY
End: 2021-04-05

## 2021-03-02 RX ORDER — SODIUM CHLORIDE, SODIUM LACTATE, POTASSIUM CHLORIDE, CALCIUM CHLORIDE 600; 310; 30; 20 MG/100ML; MG/100ML; MG/100ML; MG/100ML
INJECTION, SOLUTION INTRAVENOUS CONTINUOUS
Status: DISCONTINUED | OUTPATIENT
Start: 2021-03-02 | End: 2021-03-02 | Stop reason: HOSPADM

## 2021-03-02 RX ADMIN — SODIUM CHLORIDE, POTASSIUM CHLORIDE, SODIUM LACTATE AND CALCIUM CHLORIDE 1000 ML: 600; 310; 30; 20 INJECTION, SOLUTION INTRAVENOUS at 14:15

## 2021-03-02 ASSESSMENT — ENCOUNTER SYMPTOMS
DIARRHEA: 0
ABDOMINAL PAIN: 1
VOMITING: 0
NEUROLOGICAL NEGATIVE: 1
ANAL BLEEDING: 0
RESPIRATORY NEGATIVE: 1
CARDIOVASCULAR NEGATIVE: 1
ABDOMINAL DISTENTION: 1
HEMATOLOGIC/LYMPHATIC NEGATIVE: 1
APPETITE CHANGE: 1
ALLERGIC/IMMUNOLOGIC NEGATIVE: 1
BLOOD IN STOOL: 0
CONSTIPATION: 0
EYES NEGATIVE: 1
NAUSEA: 0
PSYCHIATRIC NEGATIVE: 1
MUSCULOSKELETAL NEGATIVE: 1
RECTAL PAIN: 0
ACTIVITY CHANGE: 1
ENDOCRINE NEGATIVE: 1

## 2021-03-02 ASSESSMENT — MIFFLIN-ST. JEOR: SCORE: 1727.15

## 2021-03-02 NOTE — DISCHARGE INSTRUCTIONS
Symptom diary over the next 48 hours and follow-up in clinic if symptoms persist with your primary care provider.  Return to the emergency department if worse or changes, concerning or new symptoms.

## 2021-03-02 NOTE — ED PROVIDER NOTES
History     Chief Complaint   Patient presents with     Abdominal Pain     abd pain, tightness in epigastric area, poor appetite, symptoms on/off for past 3-4 months     HPI  Too Osman is a 50 year old male, past medical history is significant for hypertriglyceridemia, presents to the emergency department concerns of approximately 4 to 6 weeks of abdominal fullness, tightness, anorexia.  History is obtained from the patient who states approximately 4 to 6 weeks of abdominal fullness, tightness, anorexia.  He states he was here about 4 to 6 weeks ago with chest pain that was ruled out for ACS, he was told to follow-up with his primary care provider and there is some consideration for stress and anxiety being a part of this.  He felt that he was reassured that it was not ACS and did not follow-up with respect to anxiety and stress but he relates that he thinks it today is presentation with abdominal discomfort over the last 4 to 6 weeks is probably related to anxiety and stress.  He has had a decrease in appetite as well, no nausea or vomiting, thinks he is lost maybe a few pounds in the last 4 to 6 weeks.  He notes no change in bowel habits, denies constipation or diarrhea.  No urinary frequency urgency or dysuria.  No hematuria.      COLONOSCOPY 10/02/2018  9:31 AM Rad Rslts   _______________________________________________________________________________   Patient Name: Too Osman        Procedure Date: 10/2/2018 9:31 AM   MRN: [id number]                       YOB: 1970   Admit Type: Outpatient                Age: 48   Gender: Male                          Attending MD: Barney Davis MD   Total Sedation Time:                     _______________________________________________________________________________       Procedure:           Colonoscopy   Indications:         Screening for colorectal malignant neoplasm   Providers:           Barney Davis MD, Eliz Pruett RN   Referring  MD:        Raoul Kaufman MD   Medicines:           Monitored Anesthesia Care   Complications:       No immediate complications.   _______________________________________________________________________________   Procedure:           Pre-Anesthesia Assessment:                        - Prior to the procedure, a History and Physical was                        performed, and patient medications, allergies and                        sensitivities were reviewed. The patient's tolerance of                        previous anesthesia was reviewed.                        - The risks and benefits of the procedure and the                        sedation options and risks were discussed with the                        patient. All questions were answered and informed                        consent was obtained.                        - Patient identification and proposed procedure were                        verified prior to the procedure by the physician, the                        nurse, the anesthetist and the technician. The procedure                        was verified in the pre-procedure area in the procedure                        room in the endoscopy suite.                        After obtaining informed consent, the colonoscope was                        passed under direct vision. Throughout the procedure,                        the patient's blood pressure, pulse, and oxygen                        saturations were monitored continuously. The Colonoscope                        was introduced through the anus and advanced to the                        cecum, identified by appendiceal orifice and ileocecal                        valve. The colonoscopy was performed without difficulty.                        The patient tolerated the procedure well. The quality of                        the bowel preparation was good.                                                                                     Findings:         The perianal and digital rectal examinations were normal.        A 5 mm polyp was found in the cecum. The polyp was sessile. The polyp        was removed with a hot snare. Resection was complete, but the polyp        tissue was only partially retrieved. Verification of patient        identification for the specimen was done using the patient's name, birth        date and medical record number. Estimated blood loss was minimal.        The exam was otherwise normal throughout the examined colon.        The retroflexed view of the distal rectum and anal verge was normal and        showed no anal or rectal abnormalities.                                                                                     Impression:          - One 5 mm polyp in the cecum, removed with a hot snare.                        Complete resection. Partial retrieval.   Recommendation:      - Discharge patient to home (ambulatory).                        - Await pathology results.                        - Repeat colonoscopy in 5 years for surveillance based                        on pathology results.                                                                                       Signed electronically by Barney Davis MD   _______________          Allergies:  Allergies   Allergen Reactions     Shellfish-Derived Products Other (See Comments)     Not sure, but thinks this may cause throat problems       Problem List:    Patient Active Problem List    Diagnosis Date Noted     Hypertriglyceridemia 08/22/2018     Priority: Medium        Past Medical History:    No past medical history on file.    Past Surgical History:    No past surgical history on file.    Family History:    Family History   Problem Relation Age of Onset     Anxiety Disorder Mother      Arthritis Father         gout     Other Cancer Father         ??melanoma     Dementia Maternal Grandfather      Eye Disorder Paternal Grandmother         mac degen     Hypertension  Paternal Grandmother        Social History:  Marital Status:   [2]  Social History     Tobacco Use     Smoking status: Never Smoker     Smokeless tobacco: Current User     Types: Chew     Tobacco comment: tin lasts 4 days   Substance Use Topics     Alcohol use: Yes     Alcohol/week: 0.0 standard drinks     Comment: 4-6 per week     Drug use: Never        Medications:    Probiotic Product (MISC INTESTINAL CATHERINE REGULAT) CHEW          Review of Systems   Constitutional: Positive for activity change and appetite change.   HENT: Negative.    Eyes: Negative.    Respiratory: Negative.    Cardiovascular: Negative.    Gastrointestinal: Positive for abdominal distention and abdominal pain. Negative for anal bleeding, blood in stool, constipation, diarrhea, nausea, rectal pain and vomiting.   Endocrine: Negative.    Genitourinary: Negative.    Musculoskeletal: Negative.    Allergic/Immunologic: Negative.    Neurological: Negative.    Hematological: Negative.    Psychiatric/Behavioral: Negative.        Physical Exam   BP: (!) 140/85  Pulse: 114  Temp: 98.8  F (37.1  C)  Height: 182.9 cm (6')  Weight: 83.9 kg (185 lb)  SpO2: 97 %      Physical Exam  Vitals signs and nursing note reviewed.   Constitutional:       General: He is not in acute distress.     Appearance: He is well-developed and normal weight. He is not ill-appearing.   HENT:      Head: Normocephalic and atraumatic.      Mouth/Throat:      Mouth: Mucous membranes are moist.      Pharynx: Oropharynx is clear.   Eyes:      Extraocular Movements: Extraocular movements intact.      Pupils: Pupils are equal, round, and reactive to light.   Cardiovascular:      Rate and Rhythm: Normal rate and regular rhythm.      Heart sounds: Normal heart sounds.   Pulmonary:      Effort: Pulmonary effort is normal.      Breath sounds: Normal breath sounds.   Abdominal:      Comments: Soft, bowel sounds present, slight tenderness in the epigastrium without rebound or guarding.  No  CVA tenderness.  Normal bowel sounds.     Skin:     General: Skin is warm and dry.      Capillary Refill: Capillary refill takes less than 2 seconds.   Neurological:      General: No focal deficit present.      Mental Status: He is alert.   Psychiatric:         Mood and Affect: Mood normal.         Behavior: Behavior normal.         ED Course        Procedures               Critical Care time:  none               Results for orders placed or performed during the hospital encounter of 03/02/21 (from the past 24 hour(s))   UA with Microscopic reflex to Culture    Specimen: Midstream Urine   Result Value Ref Range    Color Urine Straw     Appearance Urine Clear     Glucose Urine Negative NEG^Negative mg/dL    Bilirubin Urine Negative NEG^Negative    Ketones Urine Negative NEG^Negative mg/dL    Specific Gravity Urine 1.002 (L) 1.003 - 1.035    Blood Urine Negative NEG^Negative    pH Urine 6.0 5.0 - 7.0 pH    Protein Albumin Urine Negative NEG^Negative mg/dL    Urobilinogen mg/dL 0.0 0.0 - 2.0 mg/dL    Nitrite Urine Negative NEG^Negative    Leukocyte Esterase Urine Negative NEG^Negative    Source Midstream Urine     WBC Urine <1 0 - 5 /HPF    RBC Urine 0 0 - 2 /HPF    Mucous Urine Present (A) NEG^Negative /LPF   CBC with platelets differential   Result Value Ref Range    WBC 7.3 4.0 - 11.0 10e9/L    RBC Count 4.86 4.4 - 5.9 10e12/L    Hemoglobin 15.6 13.3 - 17.7 g/dL    Hematocrit 44.7 40.0 - 53.0 %    MCV 92 78 - 100 fl    MCH 32.1 26.5 - 33.0 pg    MCHC 34.9 31.5 - 36.5 g/dL    RDW 11.9 10.0 - 15.0 %    Platelet Count 210 150 - 450 10e9/L    Diff Method Automated Method     % Neutrophils 69.3 %    % Lymphocytes 23.4 %    % Monocytes 6.6 %    % Eosinophils 0.3 %    % Basophils 0.1 %    % Immature Granulocytes 0.3 %    Nucleated RBCs 0 0 /100    Absolute Neutrophil 5.1 1.6 - 8.3 10e9/L    Absolute Lymphocytes 1.7 0.8 - 5.3 10e9/L    Absolute Monocytes 0.5 0.0 - 1.3 10e9/L    Absolute Eosinophils 0.0 0.0 - 0.7 10e9/L     Absolute Basophils 0.0 0.0 - 0.2 10e9/L    Abs Immature Granulocytes 0.0 0 - 0.4 10e9/L    Absolute Nucleated RBC 0.0    CRP inflammation   Result Value Ref Range    CRP Inflammation <2.9 0.0 - 8.0 mg/L   Comprehensive metabolic panel   Result Value Ref Range    Sodium 139 133 - 144 mmol/L    Potassium 3.9 3.4 - 5.3 mmol/L    Chloride 106 94 - 109 mmol/L    Carbon Dioxide 27 20 - 32 mmol/L    Anion Gap 6 3 - 14 mmol/L    Glucose 99 70 - 99 mg/dL    Urea Nitrogen 13 7 - 30 mg/dL    Creatinine 1.08 0.66 - 1.25 mg/dL    GFR Estimate 78 >60 mL/min/[1.73_m2]    GFR Estimate If Black >90 >60 mL/min/[1.73_m2]    Calcium 9.3 8.5 - 10.1 mg/dL    Bilirubin Total 0.6 0.2 - 1.3 mg/dL    Albumin 4.3 3.4 - 5.0 g/dL    Protein Total 8.0 6.8 - 8.8 g/dL    Alkaline Phosphatase 72 40 - 150 U/L    ALT 30 0 - 70 U/L    AST 18 0 - 45 U/L   Lipase   Result Value Ref Range    Lipase 203 73 - 393 U/L   Abdomen, flat/upright (2 views)    Narrative    XR ABDOMEN 2 VW 3/2/2021 5:16 PM    HISTORY: abd pain, bloating    COMPARISON: None.      Impression    IMPRESSION: Bowel gas pattern is nonobstructed. No free  intraperitoneal air. No abnormal mass or calcification.    ONEIDA ROSSI MD         Medications   lactated ringers BOLUS 1,000 mL (1,000 mLs Intravenous New Bag 3/2/21 1415)     Followed by   lactated ringers infusion (has no administration in time range)   4:04 PM  I reviewed lab diagnostics which are all normal in the room with the patient.  I had placed initial order for flat and upright abdominal x-ray which the patient wanted to hold off on until lab diagnostics resulted and then discuss x-ray versus abdominal CT.  I reviewed with him that based upon his presentation benign abdominal exam and completely normal blood work including CRP, CBC, CMP, lipase, and urinalysis.  At this point I would feel comfortable and not obtaining any imaging whatsoever and having him follow-up in clinic with his primary care provider.  We discussed  the radiation dosage with an abdominal CT that is likely to be a very low yield given his symptoms and evaluation here.  He would like to go ahead with the abdominal x-ray and I ordered it.    Reviewed the x-ray in the room with the patient which is unremarkable and answered his questions.  I tried to reassure him that we are most definitely not likely to miss any acute intra-abdominal process by way of his evaluation here today and I do not at this point feel that CT scanning of his abdomen is warranted for evaluation of acute pathology.  Also reassuring is the fact that the patient had colonoscopy as recently as he has had with no significant abnormal findings, polyp resected.  I recommended that he keep a symptom diary over the next couple of days and follow-up in clinic with his primary care provider or consider the possibility of GI consultation as outpatient.  Should his symptoms change worsen or develop new or concerning symptoms he will return to the emergency department for further evaluation.        Assessments & Plan (with Medical Decision Making)   Assessments and plan with medical decision making at the time stamp above.    Disclaimer: This note consists of symbols derived from keyboarding, dictation and/or voice recognition software. As a result, there may be errors in the script that have gone undetected. Please consider this when interpreting information found in this chart.      I have reviewed the nursing notes.    I have reviewed the findings, diagnosis, plan and need for follow up with the patient.       New Prescriptions    No medications on file       Final diagnoses:   Abdominal pain, unspecified abdominal location - Etiology unclear       3/2/2021   [facility] EMERGENCY DEPT     Neo Emery MD  03/03/21 1231

## 2021-03-02 NOTE — ED NOTES
Pt comfort round: reporting LLQ abd pain, LR infusing and plan is to wait for labs to result before any imaging is done. Pt has no needs at this time. No n/v/d.

## 2021-03-03 ENCOUNTER — MYC MEDICAL ADVICE (OUTPATIENT)
Dept: FAMILY MEDICINE | Facility: CLINIC | Age: 51
End: 2021-03-03

## 2021-03-03 DIAGNOSIS — F41.9 ANXIETY: Primary | ICD-10-CM

## 2021-03-04 RX ORDER — ESCITALOPRAM OXALATE 10 MG/1
10 TABLET ORAL DAILY
Qty: 30 TABLET | Refills: 1 | Status: SHIPPED | OUTPATIENT
Start: 2021-03-04 | End: 2021-04-05

## 2021-03-06 ENCOUNTER — IMMUNIZATION (OUTPATIENT)
Dept: FAMILY MEDICINE | Facility: CLINIC | Age: 51
End: 2021-03-06
Payer: COMMERCIAL

## 2021-03-06 PROCEDURE — 0001A PR COVID VAC PFIZER DIL RECON 30 MCG/0.3 ML IM: CPT

## 2021-03-06 PROCEDURE — 91300 PR COVID VAC PFIZER DIL RECON 30 MCG/0.3 ML IM: CPT

## 2021-03-08 ENCOUNTER — VIRTUAL VISIT (OUTPATIENT)
Dept: FAMILY MEDICINE | Facility: CLINIC | Age: 51
End: 2021-03-08
Payer: COMMERCIAL

## 2021-03-08 DIAGNOSIS — F41.9 ANXIETY: Primary | ICD-10-CM

## 2021-03-08 DIAGNOSIS — K58.9 IRRITABLE BOWEL SYNDROME, UNSPECIFIED TYPE: ICD-10-CM

## 2021-03-08 PROCEDURE — 99213 OFFICE O/P EST LOW 20 MIN: CPT | Mod: 95 | Performed by: FAMILY MEDICINE

## 2021-03-08 ASSESSMENT — ANXIETY QUESTIONNAIRES
7. FEELING AFRAID AS IF SOMETHING AWFUL MIGHT HAPPEN: SEVERAL DAYS
GAD7 TOTAL SCORE: 8
1. FEELING NERVOUS, ANXIOUS, OR ON EDGE: NOT AT ALL
5. BEING SO RESTLESS THAT IT IS HARD TO SIT STILL: NOT AT ALL
3. WORRYING TOO MUCH ABOUT DIFFERENT THINGS: NOT AT ALL
6. BECOMING EASILY ANNOYED OR IRRITABLE: SEVERAL DAYS
IF YOU CHECKED OFF ANY PROBLEMS ON THIS QUESTIONNAIRE, HOW DIFFICULT HAVE THESE PROBLEMS MADE IT FOR YOU TO DO YOUR WORK, TAKE CARE OF THINGS AT HOME, OR GET ALONG WITH OTHER PEOPLE: VERY DIFFICULT
2. NOT BEING ABLE TO STOP OR CONTROL WORRYING: NEARLY EVERY DAY

## 2021-03-08 ASSESSMENT — PATIENT HEALTH QUESTIONNAIRE - PHQ9
5. POOR APPETITE OR OVEREATING: NEARLY EVERY DAY
SUM OF ALL RESPONSES TO PHQ QUESTIONS 1-9: 8

## 2021-03-08 NOTE — PATIENT INSTRUCTIONS
Follow up in 4 weeks to recheck your bowels and anxiety.    Please add a fiber supplement daily.  I would try Citracal over the counter.    I have added some handouts.    If you needed to you can add omeprazole 20 mg a day if you thought you needed.      Thank you for choosing [facility].  You may be receiving an email and/or telephone survey request from [facility] Customer Experience regarding your visit today.  Please take a few minutes to respond to the survey to let us know how we are doing.      If you have questions or concerns, please contact us via [employer] or you can contact your care team at [phone].    Our Clinic hours are:  Monday 6:40 am  to 7:00 pm  Tuesday -Friday 6:40 am to 5:00 pm    The [place] outpatient lab hours are:  Monday - Friday 6:10 am to 4:45 pm  Saturdays 7:00 am to 11:00 am  Appointments are required, call [phone]    If you have clinical questions after hours or would like to schedule an appointment,  call the clinic at [phone].      What Is Irritable Bowel Syndrome (IBS)?  People who have irritable bowel syndrome (IBS)  have digestive tracts that react abnormally to certain substances or to stress. This leads to symptoms like cramps, gas, bloating, pain, constipation, and diarrhea. IBS is sometimes called  spastic colon.  It is a common health problem. It is not a disease. But rather it's a group of symptoms that happen together.      Muscle contraction moves food through the digestive tract.    IBS--a motility problem  The muscle movement that passes food through the digestive tract (especially the colon) is called motility.  When you have IBS, this movement is disrupted. Motility may speed up, slow down, or become irregular. If stool passes too quickly through the colon, not enough water is absorbed from it. You may have loose, watery stools (diarrhea). If stool passes through the colon too slowly, too much water is absorbed and the stool becomes hard and dry.  "You may then have constipation. Also, stool and gas may back up. This can cause painful pressure and cramping.   No single test can diagnose IBS. Your healthcare provider will ask about your symptoms. You may also have blood, stool, or radiologic tests. You may even have a colonoscopy. These tests are done mainly to rule out other illnesses.    What causes IBS?  Lots of research has been done on IBS. But the cause is still not known. Some of the possible factors are:      Smoking, eating certain foods, or drinking alcohol or caffeinated drinks can cause, or \"trigger,\" symptoms of IBS.    No one knows for sure. But IBS may be caused by a problem with the nerves or muscles in your digestive tract.    Some evidence suggests that certain bacteria found after a severe gastrointestinal infection in the small intestine and colon may cause IBS.    Stress and anxiety tend to worsen the symptoms of IBS. But it is not believed to be the cause.   What you can do  Medicine can t cure IBS. But it may help manage the symptoms. It may help your digestive tract work better. Your healthcare provider may prescribe one or more medicines for you. Because some medicines may make IBS worse, don t take any medicine, especially laxatives, unless your healthcare provider prescribes it for you.   Your healthcare provider may also suggest some lifestyle changes to help control your IBS. You may have to change your diet and learn to better manage your stress. If diet changes are advised, talk with a dietitian. He or she can help you keep a healthy nutritional balance in your food intake.    [employer] last reviewed this educational content on 6/1/2019 2000-2020 [employer]. All rights reserved. This information is not intended as a substitute for professional medical care. Always follow your healthcare professional's instructions.            Irritable Bowel Syndrome    Irritable bowel syndrome (IBS) is a disorder of the " intestines. It is not a disease, but a group of symptoms caused by changes in the way the intestines work including how they move, secrete, absorb, and transit pain sensations. It is fairly common, but the cause is not well understood. There are other conditions that cause IBS symptoms, so make sure to speak with your provider to make sure that further evaluation isn't needed.  Symptoms of IBS include:    Belly pain, discomfort, and cramping    Diarrhea    Constipation or dry, hard stools    Mucous stool    Bloating    Feeling of incomplete bowel movements  It usually results in one of 3 patterns of symptoms:    Chronic belly pain and constipation    Recurring episodes of diarrhea, with belly pain or discomfort    Alternating diarrhea and constipation  Home care  The goal of treatment is to control and relieve your symptoms, so you can lead a full and active life. There is no cure for IBS. But it can be managed.  Diet  Your diet did not cause your IBS, but it can affect it. Diet and food choices may cause IBS symptoms in some people, but not everyone. No one diet works for everyone. Finding the best foods for you may take trial and error. Keep a food log to help find what foods made your symptoms worse. Below are some tips that may help you.    Eat more slowly. Eat smaller amounts at a time, but more often. Remember, you can always eat more, but cannot eat less once you ve eaten too much.    In general, fiber is very helpful for both constipation and diarrhea. However, insoluble fiber can worsen bloating, cramping, and gas. Insoluble fiber can be found in wheat bran, certain vegetables, and whole-grains. Soluble fiber is in such foods as oat bran, barley, nuts, seeds, bean, lentils, peas, and some fruits and vegetables. Increase fiber slowly and choose a product that includes soluble fiber. It helps to keep a food diary and write down the symptoms you have with certain foods.    Try lactose-free dairy products. many  people are intolerant to lactose.    Try cutting out foods that are high in fat and fatty meats.    You can control bloating or passing excess gas. Be careful with  gassy  vegetables and fruits like beans, cabbage, broccoli, and cauliflower.  Other foods called FODMAPs are a type of carbohydrate that can cause these symptoms and diarrhea. They are poorly digestible carbohydrates. Some FODMAP examples include honey, apples, pears, nectarines, lima beans, and cabbage. Talk with your provider about how to choose low FODMAP foods if you are sensitive to them.    Be careful with carbonated beverages and fruit juices. They can make your bloating and diarrhea worse.    Caffeine, alcohol, and stimulants can make symptoms worse. These include coffee, tea, sodas, energy drinks, and chocolate.    IBS diet guidelines can be confusing. Ask your provider for a referral to a registered dietitian. This professional can help you make sense of IBS diet suggestions.  Lifestyle    Look for factors that seem to worsen your symptoms. These include stress and emotions.     Although stress does not cause IBS, it may trigger flare-ups. Counseling can help you learn to handle stress. So can self-help measures like exercise, yoga, and meditation.    Depression can occur along with IBS. Your healthcare provider may prescribe antidepressant medicine. This may help with diarrhea, constipation, and cramping, as well as with symptoms of depression.    Smoking doesn't cause IBS, but can make the symptoms worse.  Medicines  Your healthcare provider may prescribe medicines. Take them as directed. For acute flare-ups of your illness, your provider may give you prescription medicines.    Check with your healthcare provider before taking any medicines for diarrhea.    Don't take anti-inflammatory medicines like ibuprofen or naproxen.    Long-term medicines can be helpful for chronic symptoms    If you have lost enough weight to make you need nutritional  supplements, talk to your provider. This may point to another more serious condition.  Follow-up care  Follow up with your healthcare provider, or as advised.  When to seek medical advice  Call your healthcare provider right away if any of these occur:    Belly pain gets worse or does not improve after a bowel movement    Constant belly pain moves to the right-lower belly    You can't keep liquids down because of vomiting    You have severe, persistent diarrhea    You have blood (red or black color) or mucus in your stool    You have lost significant weight    You feel very weak or dizzy, faint, or have extreme thirst    You have a fever of 100.4 F (38.0 C) or higher, or as directed by your healthcare provider  [employer] last reviewed this educational content on 6/1/2018 2000-2020 [employer]. All rights reserved. This information is not intended as a substitute for professional medical care. Always follow your healthcare professional's instructions.            Diet and Lifestyle Tips for Irritable Bowel Syndrome (IBS)    Your healthcare professional may suggest some lifestyle changes to help control your IBS. Changing your diet and managing stress are 2 of the most important. Follow your healthcare provider s instructions and try some of the suggestions below.   Change your diet  Your diet may be an important cause of IBS symptoms. You may want to try the following:    Pay attention to what foods bother you, and stay away from them. For example, dairy products are hard for some people to digest. Lactose-free dairy products may be better for your symptoms.    Don't eat high FODMAP foods. Other common foods that can cause symptoms contain carbohydrates called FODMAPs. These are poorly digested by your body. High FODMAP foods include some fruits such as apples, vegetables such as cabbage, and some dairy. They also include certain sweeteners such as high-fructose corn syrup, sorbitol, and xylitol. Many  people find that eating a diet low in FODMAPs can ease symptoms. Talk with your healthcare provider about a low FODMAP diet.    Drink 6 to 8 glasses of water a day.    Don't have caffeine or tobacco. These are muscle stimulants and can affect the working of your digestive tract.    Don't drink alcohol. It can irritate your digestive tract and make your symptoms worse.    Eat more fiber if constipation is a problem. Fiber makes the stool softer and easier to pass through the colon.    Eat more fiber if diarrhea is a problem. Fiber also helps to bind water. This can help to firm up loose stool.  Reduce stress  If stress or anxiety makes your IBS symptoms worse, learning how to manage stress may help you feel better. Try these tips:     Identify the causes of stress in your life.    Learn new ways to cope with them.    Regular exercise is a great way to relieve stress. It can also help ease constipation. For adults, the CDC recommends 150 minutes of moderate activity each week. It also recommends muscle-strengthening activities 2 days a week. If this sounds like a lot of time, the CDC suggests breaking physical activity into 10-minute blocks and spreading it out over a week. Developing a schedule that works for you is the key to a successful exercise program.  [employer] last reviewed this educational content on 8/1/2018 2000-2020 [employer]. All rights reserved. This information is not intended as a substitute for professional medical care. Always follow your healthcare professional's instructions.

## 2021-03-08 NOTE — PROGRESS NOTES
Chase is a 50 year old who is being evaluated via a billable telephone visit.      What phone number would you like to be contacted at? [phone]  How would you like to obtain your AVS? MyChart    Assessment & Plan     Anxiety  He has start the lexapro. No major side effects.  Follow up in 4 weeks    Irritable bowel syndrome, unspecified type  He has IBS symptoms, had normal colonoscopy in 2018, handouts are given, add fiber supplement daily                   Return in about 4 weeks (around 4/5/2021) for Telephone visit.    Raoul Kaufman MD  [facility]   Chase is a 50 year old who presents for the following health issues     HPI     Anxiety Follow-Up    How are you doing with your anxiety since your last visit? Patient states he is doing okay. He has had some stomach issue that he thinks is related to the anxiety. Patient states he needs to eat but doesn't have an appetite. Patient states he thinks did have chest pain before but now he feels that it is more stomach pain. Patient states since he last saw Dr. Kaufman February 8 his stomach issue has been a little but not severe. Patient states he has lost about 10-15 pounds in 1-2 months.    Are you having other symptoms that might be associated with anxiety? No    Have you had a significant life event? No     Are you feeling depressed? No. This stomach thing makes him not want to go anywhere or do anything.    Do you have any concerns with your use of alcohol or other drugs? No    Social History     Tobacco Use     Smoking status: Never Smoker     Smokeless tobacco: Current User     Types: Chew     Tobacco comment: tin lasts 4 days   Substance Use Topics     Alcohol use: Yes     Alcohol/week: 0.0 standard drinks     Comment: 4-6 per week     Drug use: Never     BOZENA-7 SCORE 2/8/2021   Total Score 9     PHQ 2/8/2021   PHQ-9 Total Score 3   Q9: Thoughts of better off dead/self-harm past 2 weeks Not at all     Last PHQ-9  3/8/2021   1.  Little interest or pleasure in doing things 1   2.  Feeling down, depressed, or hopeless 0   3.  Trouble falling or staying asleep, or sleeping too much 2   4.  Feeling tired or having little energy 2   5.  Poor appetite or overeating 2   6.  Feeling bad about yourself 0   7.  Trouble concentrating 1   8.  Moving slowly or restless 0   Q9: Thoughts of better off dead/self-harm past 2 weeks 0   PHQ-9 Total Score 8   Difficulty at work, home, or with people Very difficult     BOZENA-7  3/8/2021   1. Feeling nervous, anxious, or on edge 0   2. Not being able to stop or control worrying 3   3. Worrying too much about different things 0   4. Trouble relaxing 3   5. Being so restless that it is hard to sit still 0   6. Becoming easily annoyed or irritable 1   7. Feeling afraid, as if something awful might happen 1   BOZENA-7 Total Score 8   If you checked any problems, how difficult have they made it for you to do your work, take care of things at home, or get along with other people? Very difficult         How many servings of fruits and vegetables do you eat daily?  0-1    On average, how many sweetened beverages do you drink each day (Examples: soda, juice, sweet tea, etc.  Do NOT count diet or artificially sweetened beverages)?   3    How many days per week do you exercise enough to make your heart beat faster? 5. 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    How many minutes a day do you exercise enough to make your heart beat faster? 60 or more    How many days per week do you miss taking your medication? 0        Review of Systems   He reports having intermittent stomach/colon symptoms, feeling like he has to stool, sometimes liquid, sometimes gas, sometimes stool.  No blood.  No fever or chills.  No gerd. Intermittent stomach issues but no symptoms c/w an ulcer.        Objective           Vitals:  No vitals were obtained today due to virtual visit.    Physical Exam   healthy, alert and no distress  PSYCH:  Alert and oriented times 3; coherent speech, normal   rate and volume, able to articulate logical thoughts, able   to abstract reason, no tangential thoughts, no hallucinations   or delusions  His affect is normal  RESP: No cough, no audible wheezing, able to talk in full sentences  Remainder of exam unable to be completed due to telephone visits                Phone call duration: 21 minutes

## 2021-03-09 ENCOUNTER — MYC MEDICAL ADVICE (OUTPATIENT)
Dept: FAMILY MEDICINE | Facility: CLINIC | Age: 51
End: 2021-03-09

## 2021-03-09 ASSESSMENT — ANXIETY QUESTIONNAIRES: GAD7 TOTAL SCORE: 8

## 2021-03-09 NOTE — TELEPHONE ENCOUNTER
Noted and I will wait to see if he wants to start a new medication.  Raoul Kaufman MD  Family Medicine

## 2021-03-27 ENCOUNTER — IMMUNIZATION (OUTPATIENT)
Dept: FAMILY MEDICINE | Facility: CLINIC | Age: 51
End: 2021-03-27
Attending: FAMILY MEDICINE
Payer: COMMERCIAL

## 2021-03-27 PROCEDURE — 0002A PR COVID VAC PFIZER DIL RECON 30 MCG/0.3 ML IM: CPT

## 2021-03-27 PROCEDURE — 91300 PR COVID VAC PFIZER DIL RECON 30 MCG/0.3 ML IM: CPT

## 2021-04-03 ENCOUNTER — HEALTH MAINTENANCE LETTER (OUTPATIENT)
Age: 51
End: 2021-04-03

## 2021-04-05 ENCOUNTER — ANCILLARY PROCEDURE (OUTPATIENT)
Dept: GENERAL RADIOLOGY | Facility: CLINIC | Age: 51
End: 2021-04-05
Attending: FAMILY MEDICINE
Payer: COMMERCIAL

## 2021-04-05 ENCOUNTER — OFFICE VISIT (OUTPATIENT)
Dept: FAMILY MEDICINE | Facility: CLINIC | Age: 51
End: 2021-04-05
Payer: COMMERCIAL

## 2021-04-05 VITALS
BODY MASS INDEX: 24.06 KG/M2 | TEMPERATURE: 98.4 F | WEIGHT: 177.4 LBS | SYSTOLIC BLOOD PRESSURE: 132 MMHG | DIASTOLIC BLOOD PRESSURE: 62 MMHG | HEART RATE: 97 BPM | OXYGEN SATURATION: 97 % | RESPIRATION RATE: 16 BRPM

## 2021-04-05 DIAGNOSIS — R10.32 LLQ ABDOMINAL PAIN: Primary | ICD-10-CM

## 2021-04-05 DIAGNOSIS — R19.7 DIARRHEA, UNSPECIFIED TYPE: ICD-10-CM

## 2021-04-05 PROCEDURE — 99214 OFFICE O/P EST MOD 30 MIN: CPT | Performed by: FAMILY MEDICINE

## 2021-04-05 PROCEDURE — 74019 RADEX ABDOMEN 2 VIEWS: CPT | Performed by: RADIOLOGY

## 2021-04-05 ASSESSMENT — ANXIETY QUESTIONNAIRES
5. BEING SO RESTLESS THAT IT IS HARD TO SIT STILL: NOT AT ALL
1. FEELING NERVOUS, ANXIOUS, OR ON EDGE: SEVERAL DAYS
2. NOT BEING ABLE TO STOP OR CONTROL WORRYING: NOT AT ALL
3. WORRYING TOO MUCH ABOUT DIFFERENT THINGS: SEVERAL DAYS
6. BECOMING EASILY ANNOYED OR IRRITABLE: SEVERAL DAYS
GAD7 TOTAL SCORE: 4
7. FEELING AFRAID AS IF SOMETHING AWFUL MIGHT HAPPEN: SEVERAL DAYS
IF YOU CHECKED OFF ANY PROBLEMS ON THIS QUESTIONNAIRE, HOW DIFFICULT HAVE THESE PROBLEMS MADE IT FOR YOU TO DO YOUR WORK, TAKE CARE OF THINGS AT HOME, OR GET ALONG WITH OTHER PEOPLE: SOMEWHAT DIFFICULT

## 2021-04-05 ASSESSMENT — PATIENT HEALTH QUESTIONNAIRE - PHQ9: 5. POOR APPETITE OR OVEREATING: NOT AT ALL

## 2021-04-05 NOTE — PROGRESS NOTES
Assessment & Plan     LLQ abdominal pain  Discussed he could have mild gastroenteritis with diarrhea.  No fever or chills or blood.  No significant pain. Will at this time monitor.  If diarrhea continues do a stool work up.  If diarrhea resolves but still has pain in LLQ get CT scan to get diverticula however less likely based off his recent colonoscopy in 2018. Will not go back to ssri at this time.  If all is normal would consider GI consult  - XR Abdomen 2 Views    Diarrhea, unspecified type  As above  - XR Abdomen 2 Views      Spent over 30 minutes, 7 minutes prior to seeing patient reviewing chart and messages, 16 minutes with the patient counseling and 8 minutes with documentation.           See Patient Instructions    Return in about 4 weeks (around 5/3/2021) for If not better.    Raoul Kaufman MD  [facility]    Nayeli Stone is a 50 year old who presents for the following health issues    HPI     Anxiety Follow-Up    How are you doing with your anxiety since your last visit? No change. Patient states when he took the Lexapro he felt like someone poured warm water on him. Felt like a warm tingly rush through his body. Patient states he has felt pretty good (his stomach) in the last few weeks but then on Saturday he wasn't feeling that good. Patient states he had steak and eggs and a few hours later he had to go to the bathroom. Patient states since Saturday he has had some pain (more bothersome than pain).    Are you having other symptoms that might be associated with anxiety? No    Have you had a significant life event? No     Are you feeling depressed? No    Do you have any concerns with your use of alcohol or other drugs? No    Social History     Tobacco Use     Smoking status: Never Smoker     Smokeless tobacco: Current User     Types: Chew     Tobacco comment: tin lasts 4 days   Substance Use Topics     Alcohol use: Yes     Alcohol/week: 0.0 standard drinks      Comment: 4-6 per week     Drug use: Never     BOZENA-7 SCORE 2/8/2021 3/8/2021   Total Score 9 8     PHQ 2/8/2021 3/8/2021   PHQ-9 Total Score 3 8   Q9: Thoughts of better off dead/self-harm past 2 weeks Not at all Not at all     Patient has been having some LLQ discomfort ongoing since last fall.  The question was anxiety or IBS.  He had a colonoscopy 10/2018 and noted only one polyp and no diverticula.  He reports after I talked with him and started the lexapro he had a side effect and stopped the medication.  He did start citracil on a regular basis.  He report sister had tingling sensation of his lexapro too.  He was doing well with the fiber supplement until this past weekend when he had some diarrhea and LLQ pain.  No fever or chills.  No blood in his stools.  He had steak and potatoes on Friday, steak and eggs the next morning, went to [facility] and was doing better but afterwards and today having symptoms.  Still intermittent diarrhea.  Wondering what is going on since this is chronic.      How many servings of fruits and vegetables do you eat daily?  0-1    On average, how many sweetened beverages do you drink each day (Examples: soda, juice, sweet tea, etc.  Do NOT count diet or artificially sweetened beverages)?   2    How many days per week do you exercise enough to make your heart beat faster? 3 or less. Also has an active job.    How many minutes a day do you exercise enough to make your heart beat faster? 30 - 60    How many days per week do you miss taking your medication? 0        Review of Systems   Review Of Systems  Skin: negative  Eyes:   Ears/Nose/Throat:   Respiratory: No shortness of breath, dyspnea on exertion, cough, or hemoptysis  Cardiovascular: negative  Gastrointestinal: as above  Genitourinary:   Musculoskeletal:   Neurologic:   Psychiatric:   Hematologic/Lymphatic/Immunologic: negative  Endocrine:         Objective    /62   Pulse 97   Temp 98.4  F (36.9  C) (Tympanic)   Resp 16    Wt 80.5 kg (177 lb 6.4 oz)   SpO2 97%   BMI 24.06 kg/m    Body mass index is 24.06 kg/m .  Physical Exam   GENERAL APPEARANCE: healthy, alert and no distress  RESP: lungs clear to auscultation - no rales, rhonchi or wheezes  CV: regular rates and rhythm, normal S1 S2, no S3 or S4 and no murmur, click or rub  ABDOMEN: soft, nontender, without hepatosplenomegaly or masses and bowel sounds hyperactive  MS: extremities normal- no gross deformities noted  SKIN: no suspicious lesions or rashes  NEURO: Normal strength and tone, mentation intact and speech normal  PSYCH: mentation appears normal and affect normal/bright        I have personally reviewed the xray and my interpretation is the following:  Abdomen 2 view shows no signs of free air, obstruction or significant stool.

## 2021-04-05 NOTE — PATIENT INSTRUCTIONS
Please monitor your diarrhea.    If you are still having symptoms of discomfort in the left lower quadrant please let me know and I will get a CT scan.    If are still having diarrhea over a week please let me know we can check your stools.    You can use immodium as needed for diarrhea.    It sounds like your citracil regiment is working great.      Thank you for choosing [facility].  You may be receiving an email and/or telephone survey request from [facility] Customer Experience regarding your visit today.  Please take a few minutes to respond to the survey to let us know how we are doing.      If you have questions or concerns, please contact us via [employer] or you can contact your care team at [phone].    Our Clinic hours are:  Monday 6:40 am  to 7:00 pm  Tuesday -Friday 6:40 am to 5:00 pm    The [place] outpatient lab hours are:  Monday - Friday 6:10 am to 4:45 pm  Saturdays 7:00 am to 11:00 am  Appointments are required, call [phone]    If you have clinical questions after hours or would like to schedule an appointment,  call the clinic at [phone].

## 2021-04-06 ASSESSMENT — ANXIETY QUESTIONNAIRES: GAD7 TOTAL SCORE: 4

## 2021-04-19 ENCOUNTER — MYC MEDICAL ADVICE (OUTPATIENT)
Dept: FAMILY MEDICINE | Facility: CLINIC | Age: 51
End: 2021-04-19

## 2021-04-19 DIAGNOSIS — K58.9 IRRITABLE BOWEL SYNDROME, UNSPECIFIED TYPE: Primary | ICD-10-CM

## 2021-04-19 DIAGNOSIS — R19.7 DIARRHEA, UNSPECIFIED TYPE: ICD-10-CM

## 2021-04-19 RX ORDER — PAROXETINE 20 MG/1
20 TABLET, FILM COATED ORAL EVERY MORNING
Qty: 30 TABLET | Refills: 1 | Status: SHIPPED | OUTPATIENT
Start: 2021-04-19 | End: 2021-05-11

## 2021-04-19 NOTE — TELEPHONE ENCOUNTER
Patient has read Adjudicahart without further response, so closing encounter.    Chelle So RN  [facility]

## 2021-04-19 NOTE — TELEPHONE ENCOUNTER
I have sent a prescription for paxil to his pharmacy.  Please follow up with me virtually in 3 weeks for a recheck.  Raoul Kaufman MD  Family Medicine

## 2021-04-19 NOTE — TELEPHONE ENCOUNTER
MyChart sent and will leave encounter open until read in case of response, then may close.     Chelle So RN  [facility]

## 2021-04-21 ENCOUNTER — MYC MEDICAL ADVICE (OUTPATIENT)
Dept: FAMILY MEDICINE | Facility: CLINIC | Age: 51
End: 2021-04-21

## 2021-04-21 NOTE — TELEPHONE ENCOUNTER
Please call Chase,  GI side effect are not uncommon for the first few days of using the Paxil medication. The reason is that serotonin besides being in our brain is also used in communication with our GI system.  For both the brain and gut it may take a few days to get used to the medication.  I recommend to continue with the medication as a trial for the rest of the week and give it some time.  Sincerely,  Raoul Kaufman MD

## 2021-04-21 NOTE — TELEPHONE ENCOUNTER
Pt informed.  He will give it a couple weeks and call back if further problems.    Lety Bustamante RN

## 2021-04-22 ENCOUNTER — MYC MEDICAL ADVICE (OUTPATIENT)
Dept: FAMILY MEDICINE | Facility: CLINIC | Age: 51
End: 2021-04-22

## 2021-04-23 ENCOUNTER — APPOINTMENT (OUTPATIENT)
Dept: CT IMAGING | Facility: CLINIC | Age: 51
End: 2021-04-23
Attending: PHYSICIAN ASSISTANT
Payer: COMMERCIAL

## 2021-04-23 ENCOUNTER — HOSPITAL ENCOUNTER (EMERGENCY)
Facility: CLINIC | Age: 51
Discharge: HOME | End: 2021-04-23
Attending: PHYSICIAN ASSISTANT | Admitting: PHYSICIAN ASSISTANT
Payer: COMMERCIAL

## 2021-04-23 VITALS
TEMPERATURE: 97.7 F | DIASTOLIC BLOOD PRESSURE: 87 MMHG | OXYGEN SATURATION: 96 % | SYSTOLIC BLOOD PRESSURE: 122 MMHG | BODY MASS INDEX: 24.38 KG/M2 | HEIGHT: 72 IN | RESPIRATION RATE: 18 BRPM | WEIGHT: 180 LBS | HEART RATE: 79 BPM

## 2021-04-23 DIAGNOSIS — T88.7XXA MEDICATION SIDE EFFECTS: ICD-10-CM

## 2021-04-23 DIAGNOSIS — R10.12 LUQ ABDOMINAL PAIN: ICD-10-CM

## 2021-04-23 LAB
ALBUMIN SERPL-MCNC: 4.5 G/DL (ref 3.4–5)
ALBUMIN UR-MCNC: NEGATIVE MG/DL
ALP SERPL-CCNC: 66 U/L (ref 40–150)
ALT SERPL W P-5'-P-CCNC: 32 U/L (ref 0–70)
ANION GAP SERPL CALCULATED.3IONS-SCNC: 6 MMOL/L (ref 3–14)
APPEARANCE UR: CLEAR
AST SERPL W P-5'-P-CCNC: 19 U/L (ref 0–45)
BASOPHILS # BLD AUTO: 0 10E9/L (ref 0–0.2)
BASOPHILS NFR BLD AUTO: 0.4 %
BILIRUB SERPL-MCNC: 0.8 MG/DL (ref 0.2–1.3)
BILIRUB UR QL STRIP: NEGATIVE
BUN SERPL-MCNC: 9 MG/DL (ref 7–30)
CALCIUM SERPL-MCNC: 8.9 MG/DL (ref 8.5–10.1)
CHLORIDE SERPL-SCNC: 101 MMOL/L (ref 94–109)
CO2 SERPL-SCNC: 29 MMOL/L (ref 20–32)
COLOR UR AUTO: NORMAL
CREAT SERPL-MCNC: 0.88 MG/DL (ref 0.66–1.25)
DIFFERENTIAL METHOD BLD: NORMAL
EOSINOPHIL # BLD AUTO: 0 10E9/L (ref 0–0.7)
EOSINOPHIL NFR BLD AUTO: 0.2 %
ERYTHROCYTE [DISTWIDTH] IN BLOOD BY AUTOMATED COUNT: 11.6 % (ref 10–15)
GFR SERPL CREATININE-BSD FRML MDRD: >90 ML/MIN/{1.73_M2}
GLUCOSE SERPL-MCNC: 107 MG/DL (ref 70–99)
GLUCOSE UR STRIP-MCNC: NEGATIVE MG/DL
HCT VFR BLD AUTO: 44 % (ref 40–53)
HGB BLD-MCNC: 15.6 G/DL (ref 13.3–17.7)
HGB UR QL STRIP: NEGATIVE
IMM GRANULOCYTES # BLD: 0 10E9/L (ref 0–0.4)
IMM GRANULOCYTES NFR BLD: 0.4 %
KETONES UR STRIP-MCNC: NEGATIVE MG/DL
LEUKOCYTE ESTERASE UR QL STRIP: NEGATIVE
LIPASE SERPL-CCNC: 344 U/L (ref 73–393)
LYMPHOCYTES # BLD AUTO: 1.7 10E9/L (ref 0.8–5.3)
LYMPHOCYTES NFR BLD AUTO: 21.5 %
MCH RBC QN AUTO: 31.8 PG (ref 26.5–33)
MCHC RBC AUTO-ENTMCNC: 35.5 G/DL (ref 31.5–36.5)
MCV RBC AUTO: 90 FL (ref 78–100)
MONOCYTES # BLD AUTO: 0.6 10E9/L (ref 0–1.3)
MONOCYTES NFR BLD AUTO: 7.1 %
NEUTROPHILS # BLD AUTO: 5.7 10E9/L (ref 1.6–8.3)
NEUTROPHILS NFR BLD AUTO: 70.4 %
NITRATE UR QL: NEGATIVE
NRBC # BLD AUTO: 0 10*3/UL
NRBC BLD AUTO-RTO: 0 /100
PH UR STRIP: 6 PH (ref 5–7)
PLATELET # BLD AUTO: 216 10E9/L (ref 150–450)
POTASSIUM SERPL-SCNC: 3.5 MMOL/L (ref 3.4–5.3)
PROT SERPL-MCNC: 8 G/DL (ref 6.8–8.8)
RBC # BLD AUTO: 4.91 10E12/L (ref 4.4–5.9)
RBC #/AREA URNS AUTO: 0 /HPF (ref 0–2)
SODIUM SERPL-SCNC: 136 MMOL/L (ref 133–144)
SOURCE: NORMAL
SP GR UR STRIP: 1 (ref 1–1.03)
TSH SERPL DL<=0.005 MIU/L-ACNC: 2.59 MU/L (ref 0.4–4)
UROBILINOGEN UR STRIP-MCNC: 0 MG/DL (ref 0–2)
WBC # BLD AUTO: 8.1 10E9/L (ref 4–11)
WBC #/AREA URNS AUTO: <1 /HPF (ref 0–5)

## 2021-04-23 PROCEDURE — 96375 TX/PRO/DX INJ NEW DRUG ADDON: CPT | Performed by: PHYSICIAN ASSISTANT

## 2021-04-23 PROCEDURE — 99284 EMERGENCY DEPT VISIT MOD MDM: CPT | Performed by: PHYSICIAN ASSISTANT

## 2021-04-23 PROCEDURE — 250N000009 HC RX 250: Performed by: PHYSICIAN ASSISTANT

## 2021-04-23 PROCEDURE — 250N000011 HC RX IP 250 OP 636: Performed by: PHYSICIAN ASSISTANT

## 2021-04-23 PROCEDURE — 85025 COMPLETE CBC W/AUTO DIFF WBC: CPT | Performed by: PHYSICIAN ASSISTANT

## 2021-04-23 PROCEDURE — 96361 HYDRATE IV INFUSION ADD-ON: CPT | Performed by: PHYSICIAN ASSISTANT

## 2021-04-23 PROCEDURE — 74177 CT ABD & PELVIS W/CONTRAST: CPT

## 2021-04-23 PROCEDURE — 80053 COMPREHEN METABOLIC PANEL: CPT | Performed by: PHYSICIAN ASSISTANT

## 2021-04-23 PROCEDURE — 99285 EMERGENCY DEPT VISIT HI MDM: CPT | Mod: 25 | Performed by: PHYSICIAN ASSISTANT

## 2021-04-23 PROCEDURE — 96374 THER/PROPH/DIAG INJ IV PUSH: CPT | Mod: 59 | Performed by: PHYSICIAN ASSISTANT

## 2021-04-23 PROCEDURE — 84443 ASSAY THYROID STIM HORMONE: CPT | Performed by: PHYSICIAN ASSISTANT

## 2021-04-23 PROCEDURE — 250N000013 HC RX MED GY IP 250 OP 250 PS 637: Performed by: PHYSICIAN ASSISTANT

## 2021-04-23 PROCEDURE — 83690 ASSAY OF LIPASE: CPT | Performed by: PHYSICIAN ASSISTANT

## 2021-04-23 PROCEDURE — 81001 URINALYSIS AUTO W/SCOPE: CPT | Performed by: PHYSICIAN ASSISTANT

## 2021-04-23 PROCEDURE — 258N000003 HC RX IP 258 OP 636: Performed by: PHYSICIAN ASSISTANT

## 2021-04-23 RX ORDER — IOPAMIDOL 755 MG/ML
88 INJECTION, SOLUTION INTRAVASCULAR ONCE
Status: COMPLETED | OUTPATIENT
Start: 2021-04-23 | End: 2021-04-23

## 2021-04-23 RX ORDER — KETOROLAC TROMETHAMINE 15 MG/ML
15 INJECTION, SOLUTION INTRAMUSCULAR; INTRAVENOUS ONCE
Status: COMPLETED | OUTPATIENT
Start: 2021-04-23 | End: 2021-04-23

## 2021-04-23 RX ORDER — SODIUM CHLORIDE 9 MG/ML
INJECTION, SOLUTION INTRAVENOUS CONTINUOUS
Status: DISCONTINUED | OUTPATIENT
Start: 2021-04-23 | End: 2021-04-24 | Stop reason: HOSPADM

## 2021-04-23 RX ORDER — ONDANSETRON 2 MG/ML
4 INJECTION INTRAMUSCULAR; INTRAVENOUS EVERY 30 MIN PRN
Status: DISCONTINUED | OUTPATIENT
Start: 2021-04-23 | End: 2021-04-24 | Stop reason: HOSPADM

## 2021-04-23 RX ORDER — LORAZEPAM 0.5 MG/1
0.5 TABLET ORAL ONCE
Status: COMPLETED | OUTPATIENT
Start: 2021-04-23 | End: 2021-04-23

## 2021-04-23 RX ADMIN — KETOROLAC TROMETHAMINE 15 MG: 15 INJECTION, SOLUTION INTRAMUSCULAR; INTRAVENOUS at 22:20

## 2021-04-23 RX ADMIN — ONDANSETRON 4 MG: 2 INJECTION INTRAMUSCULAR; INTRAVENOUS at 21:27

## 2021-04-23 RX ADMIN — SODIUM CHLORIDE 1000 ML: 9 INJECTION, SOLUTION INTRAVENOUS at 21:26

## 2021-04-23 RX ADMIN — LORAZEPAM 0.5 MG: 0.5 TABLET ORAL at 21:28

## 2021-04-23 RX ADMIN — IOPAMIDOL 88 ML: 755 INJECTION, SOLUTION INTRAVENOUS at 21:49

## 2021-04-23 RX ADMIN — SODIUM CHLORIDE 62 ML: 9 INJECTION, SOLUTION INTRAVENOUS at 21:50

## 2021-04-23 ASSESSMENT — ENCOUNTER SYMPTOMS
CONSTIPATION: 0
ABDOMINAL PAIN: 1
RESPIRATORY NEGATIVE: 1
ANAL BLEEDING: 0
COUGH: 0
RECTAL PAIN: 0
WEAKNESS: 0
EYES NEGATIVE: 1
HEADACHES: 1
MUSCULOSKELETAL NEGATIVE: 1
BLOOD IN STOOL: 0
DIARRHEA: 0
VOMITING: 0
APPETITE CHANGE: 1
CARDIOVASCULAR NEGATIVE: 1
DIAPHORESIS: 0
PSYCHIATRIC NEGATIVE: 1
NAUSEA: 1
ABDOMINAL DISTENTION: 0
ACTIVITY CHANGE: 1
SHORTNESS OF BREATH: 0
DIZZINESS: 0
NUMBNESS: 0
PALPITATIONS: 0
CHILLS: 0
FEVER: 0

## 2021-04-23 ASSESSMENT — MIFFLIN-ST. JEOR: SCORE: 1704.47

## 2021-04-23 NOTE — TELEPHONE ENCOUNTER
Patient My Charted that he is stopping his Paxil.  Diarrhea has stopped.    We talked about not stopping Paxil abruptly.  Kimberly Mary RN

## 2021-04-24 NOTE — DISCHARGE INSTRUCTIONS
Increase fluids, rest can stop Paxil without having to taper since you have only taken it for 4 days.    [place] diet, trying relaxation techniques like meditation, yoga, or mindful breathing can help.    Follow-up with primary care doctor for recheck in few days.    Return the emergency department symptoms worsen or change.

## 2021-04-24 NOTE — ED PROVIDER NOTES
History     Chief Complaint   Patient presents with     Medication Reaction     Rrecently started Paroxetine 4 days ago. stomach upset, fidgity, headache.      HPI  Too Osman is a 50 year old male with history of hypertriglyceridemia who presents today for concerns of possible reaction to Paxil and persistent abdominal issues.  Patient states he started Paxil 4 days ago and since then he has had progressively worsening upset stomach, nausea, fidgety, headache, decreased appetite.  Patient states that he talk to his primary care doctor who said these can all be typical side effects of Paxil when you initially started however he does not think he can continue with this since it is progressively getting worse and he has had to miss work this week.  Patient states that he has been dealing with ongoing abdominal issues for the past few months and has lost about 20 pounds over the past couple months.  Patient denies any fever, vomiting, diarrhea, bloody or black tarry stools, shortness of breath, chest pain, heart racing or skipping beats, dizziness, confusion, visual changes, URI type symptoms, or rash.  He denies any lip/facial/tongue swelling.  He would like to stop the Paxil and not sure if he has to taper off this medication or not.  He would also like to make sure that there is nothing else causing these issues.  He states he had labs and x-ray images done in the past however has never had a CT scan.  Patient states his last colonoscopy was 2 years ago and was normal. He has tried tylenol for symptoms with no relief.     Allergies:  Allergies   Allergen Reactions     Shellfish-Derived Products Other (See Comments)     Not sure, but thinks this may cause throat problems       Problem List:    Patient Active Problem List    Diagnosis Date Noted     Hypertriglyceridemia 08/22/2018     Priority: Medium        Past Medical History:    History reviewed. No pertinent past medical history.    Past Surgical  History:    History reviewed. No pertinent surgical history.    Family History:    Family History   Problem Relation Age of Onset     Anxiety Disorder Mother      Arthritis Father         gout     Other Cancer Father         ??melanoma     Dementia Maternal Grandfather      Eye Disorder Paternal Grandmother         mac degen     Hypertension Paternal Grandmother        Social History:  Marital Status:   [2]  Social History     Tobacco Use     Smoking status: Never Smoker     Smokeless tobacco: Current User     Types: Chew     Tobacco comment: tin lasts 4 days   Substance Use Topics     Alcohol use: Yes     Alcohol/week: 0.0 standard drinks     Comment: 4-6 per week     Drug use: Never        Medications:    methylcellulose (CITRUCEL) 500 MG TABS tablet  PARoxetine (PAXIL) 20 MG tablet          Review of Systems   Constitutional: Positive for activity change and appetite change. Negative for chills, diaphoresis and fever.   HENT: Negative.    Eyes: Negative.    Respiratory: Negative.  Negative for cough and shortness of breath.    Cardiovascular: Negative.  Negative for chest pain, palpitations and leg swelling.   Gastrointestinal: Positive for abdominal pain and nausea. Negative for abdominal distention, anal bleeding, blood in stool, constipation, diarrhea, rectal pain and vomiting.   Genitourinary: Negative.    Musculoskeletal: Negative.    Skin: Negative.    Neurological: Positive for headaches. Negative for dizziness, weakness and numbness.   Psychiatric/Behavioral: Negative.         Feel very fidgety   All other systems reviewed and are negative.      Physical Exam   BP: (!) 160/88  Pulse: 79  Temp: 97.7  F (36.5  C)  Resp: 18  Height: 182.9 cm (6')  Weight: 81.6 kg (180 lb)  SpO2: 98 %      Physical Exam  Vitals signs and nursing note reviewed.   Constitutional:       General: He is not in acute distress.     Appearance: Normal appearance. He is normal weight. He is not ill-appearing or toxic-appearing.    HENT:      Head: Normocephalic and atraumatic.      Mouth/Throat:      Mouth: Mucous membranes are moist.      Pharynx: Oropharynx is clear. No posterior oropharyngeal erythema.   Eyes:      General: No scleral icterus.     Extraocular Movements: Extraocular movements intact.      Conjunctiva/sclera: Conjunctivae normal.      Pupils: Pupils are equal, round, and reactive to light.   Neck:      Musculoskeletal: Normal range of motion and neck supple.   Cardiovascular:      Rate and Rhythm: Normal rate and regular rhythm.      Pulses: Normal pulses.      Heart sounds: Normal heart sounds.   Pulmonary:      Effort: Pulmonary effort is normal.      Breath sounds: Normal breath sounds.   Abdominal:      General: Abdomen is flat. Bowel sounds are normal. There is no distension.      Palpations: Abdomen is soft. There is no mass.      Tenderness: There is no abdominal tenderness (mild to the left abdomen. ). There is no right CVA tenderness, left CVA tenderness, guarding or rebound.   Skin:     General: Skin is warm.      Capillary Refill: Capillary refill takes less than 2 seconds.      Findings: No bruising, erythema or rash.   Neurological:      General: No focal deficit present.      Mental Status: He is alert and oriented to person, place, and time.      Sensory: No sensory deficit.      Motor: No weakness.      Gait: Gait normal.   Psychiatric:         Mood and Affect: Mood normal.         Behavior: Behavior normal.         Thought Content: Thought content normal.         Judgment: Judgment normal.      Comments: Anxious          ED Course        Procedures              Critical Care time:  none                  Results for orders placed or performed during the hospital encounter of 04/23/21 (from the past 24 hour(s))   UA with Microscopic   Result Value Ref Range    Color Urine Straw     Appearance Urine Clear     Glucose Urine Negative NEG^Negative mg/dL    Bilirubin Urine Negative NEG^Negative    Ketones Urine  Negative NEG^Negative mg/dL    Specific Gravity Urine 1.003 1.003 - 1.035    Blood Urine Negative NEG^Negative    pH Urine 6.0 5.0 - 7.0 pH    Protein Albumin Urine Negative NEG^Negative mg/dL    Urobilinogen mg/dL 0.0 0.0 - 2.0 mg/dL    Nitrite Urine Negative NEG^Negative    Leukocyte Esterase Urine Negative NEG^Negative    Source Midstream Urine     WBC Urine <1 0 - 5 /HPF    RBC Urine 0 0 - 2 /HPF   CBC with platelets differential   Result Value Ref Range    WBC 8.1 4.0 - 11.0 10e9/L    RBC Count 4.91 4.4 - 5.9 10e12/L    Hemoglobin 15.6 13.3 - 17.7 g/dL    Hematocrit 44.0 40.0 - 53.0 %    MCV 90 78 - 100 fl    MCH 31.8 26.5 - 33.0 pg    MCHC 35.5 31.5 - 36.5 g/dL    RDW 11.6 10.0 - 15.0 %    Platelet Count 216 150 - 450 10e9/L    Diff Method Automated Method     % Neutrophils 70.4 %    % Lymphocytes 21.5 %    % Monocytes 7.1 %    % Eosinophils 0.2 %    % Basophils 0.4 %    % Immature Granulocytes 0.4 %    Nucleated RBCs 0 0 /100    Absolute Neutrophil 5.7 1.6 - 8.3 10e9/L    Absolute Lymphocytes 1.7 0.8 - 5.3 10e9/L    Absolute Monocytes 0.6 0.0 - 1.3 10e9/L    Absolute Eosinophils 0.0 0.0 - 0.7 10e9/L    Absolute Basophils 0.0 0.0 - 0.2 10e9/L    Abs Immature Granulocytes 0.0 0 - 0.4 10e9/L    Absolute Nucleated RBC 0.0    Comprehensive metabolic panel   Result Value Ref Range    Sodium 136 133 - 144 mmol/L    Potassium 3.5 3.4 - 5.3 mmol/L    Chloride 101 94 - 109 mmol/L    Carbon Dioxide 29 20 - 32 mmol/L    Anion Gap 6 3 - 14 mmol/L    Glucose 107 (H) 70 - 99 mg/dL    Urea Nitrogen 9 7 - 30 mg/dL    Creatinine 0.88 0.66 - 1.25 mg/dL    GFR Estimate >90 >60 mL/min/[1.73_m2]    GFR Estimate If Black >90 >60 mL/min/[1.73_m2]    Calcium 8.9 8.5 - 10.1 mg/dL    Bilirubin Total 0.8 0.2 - 1.3 mg/dL    Albumin 4.5 3.4 - 5.0 g/dL    Protein Total 8.0 6.8 - 8.8 g/dL    Alkaline Phosphatase 66 40 - 150 U/L    ALT 32 0 - 70 U/L    AST 19 0 - 45 U/L   Lipase   Result Value Ref Range    Lipase 344 73 - 393 U/L   TSH with  free T4 reflex   Result Value Ref Range    TSH 2.59 0.40 - 4.00 mU/L   CT Abdomen Pelvis w Contrast    Narrative    EXAM: CT ABDOMEN PELVIS W CONTRAST  LOCATION: [facility]  DATE/TIME: 4/23/2021 9:46 PM    INDICATION: Abdominal pain, acute, nonlocalized  COMPARISON: None.  TECHNIQUE: CT scan of the abdomen and pelvis was performed following injection of IV contrast. Multiplanar reformats were obtained. Dose reduction techniques were used.  CONTRAST: 88 ml Isovue 370    FINDINGS:   LOWER CHEST: Normal.    HEPATOBILIARY: Normal.    PANCREAS: Normal.    SPLEEN: Normal.    ADRENAL GLANDS: Normal.    KIDNEYS/BLADDER: Normal.    BOWEL: Normal.    LYMPH NODES: Normal.    VASCULATURE: Unremarkable.    PELVIC ORGANS: Normal.    MUSCULOSKELETAL: Normal.      Impression    IMPRESSION:   1.  Negative enhanced CT abdomen and pelvis.       Medications   0.9% sodium chloride BOLUS (1,000 mLs Intravenous New Bag 4/23/21 2126)     Followed by   sodium chloride 0.9% infusion (has no administration in time range)   ondansetron (ZOFRAN) injection 4 mg (4 mg Intravenous Given 4/23/21 2127)   LORazepam (ATIVAN) tablet 0.5 mg (0.5 mg Oral Given 4/23/21 2128)   iopamidol (ISOVUE-370) solution 88 mL (88 mLs Intravenous Given 4/23/21 2149)   sodium chloride 0.9 % bag 500mL for CT scan flush use (62 mLs As instructed Given 4/23/21 2150)   ketorolac (TORADOL) injection 15 mg (15 mg Intravenous Given 4/23/21 2220)       Assessments & Plan (with Medical Decision Making)     I have reviewed the nursing notes.    I have reviewed the findings, diagnosis, plan and need for follow up with the patient.  Too Osman is a 50 year old male with history of hypertriglyceridemia who presents today for concerns of possible reaction to Paxil and persistent abdominal issues.  Patient states he started Paxil 4 days ago and since then he has had progressively worsening upset stomach, nausea, fidgety, headache, decreased appetite.  Patient  states that he talk to his primary care doctor who said these can all be typical side effects of Paxil when you initially started however he does not think he can continue with this since it is progressively getting worse and he has had to miss work this week.  Patient states that he has been dealing with ongoing abdominal issues for the past few months and has lost about 20 pounds over the past couple months.  Patient denies any fever, vomiting, diarrhea, bloody or black tarry stools, shortness of breath, chest pain, heart racing or skipping beats, dizziness, confusion, visual changes, URI type symptoms, or rash.  He denies any lip/facial/tongue swelling.  He would like to stop the Paxil and not sure if he has to taper off this medication or not.  He would also like to make sure that there is nothing else causing these issues.  He states he had labs and x-ray images done in the past however has never had a CT scan.  Patient states his last colonoscopy was 2 years ago and was normal. He has tried tylenol for symptoms with no relief.     See exam findings above.  See exam findings above.  Lab work all within normal limits, urine negative and CT scan of the abdomen pelvis were also negative.  Patient states he feels better after the Ativan however still has a slight headache and GI upset.  Patient was given Toradol for the headache and informed that he can stop the Paxil which she would like to do since the symptoms are worsening since he started Paxil 4 days ago.  Patient was informed that he does not need to taper this since its been less than 1 week on Paxil.  Patient increase fluids, rest, bland diet, follow-up with primary care doctor for recheck in few days and to try relaxation techniques like meditation, yoga, and mindful breathing to see if this helps with some of the symptoms.  Also discussed possible counseling or therapies that do not include medication for symptoms possible anxiety type symptoms.  Patient  agrees with this plan and discharged in stable condition.  No concerns for acute allergic reaction   Her cardiac work-up.  Vitals within normal limits other than slightly elevated blood pressure.  I suspect that the symptoms are related to side effects of the Paxil on top of his anxiety.    New Prescriptions    No medications on file       Final diagnoses:   LUQ abdominal pain   Medication side effects - patient can stop paxil       4/23/2021   [facility] EMERGENCY DEPT     Digna Busch PA-C  04/23/21 [id number]

## 2021-04-25 ENCOUNTER — MYC MEDICAL ADVICE (OUTPATIENT)
Dept: FAMILY MEDICINE | Facility: CLINIC | Age: 51
End: 2021-04-25

## 2021-04-26 NOTE — TELEPHONE ENCOUNTER
[employer] sent advising f/u visit with instructions on scheduling.     Chelle So RN  [facility]

## 2021-04-26 NOTE — TELEPHONE ENCOUNTER
Routed to Dr Kaufman.  Pt stopped Paxil on 4/23/21.  We went to ED due to apparent side effects which were intolerable.  Please see [employer] message.  Next step?  Lety Bustamante RN

## 2021-05-11 ENCOUNTER — VIRTUAL VISIT (OUTPATIENT)
Dept: FAMILY MEDICINE | Facility: CLINIC | Age: 51
End: 2021-05-11
Payer: COMMERCIAL

## 2021-05-11 DIAGNOSIS — K58.0 IRRITABLE BOWEL SYNDROME WITH DIARRHEA: ICD-10-CM

## 2021-05-11 DIAGNOSIS — F41.9 ANXIETY: ICD-10-CM

## 2021-05-11 PROCEDURE — 99214 OFFICE O/P EST MOD 30 MIN: CPT | Mod: 95 | Performed by: FAMILY MEDICINE

## 2021-05-11 ASSESSMENT — ANXIETY QUESTIONNAIRES
7. FEELING AFRAID AS IF SOMETHING AWFUL MIGHT HAPPEN: NOT AT ALL
IF YOU CHECKED OFF ANY PROBLEMS ON THIS QUESTIONNAIRE, HOW DIFFICULT HAVE THESE PROBLEMS MADE IT FOR YOU TO DO YOUR WORK, TAKE CARE OF THINGS AT HOME, OR GET ALONG WITH OTHER PEOPLE: SOMEWHAT DIFFICULT
3. WORRYING TOO MUCH ABOUT DIFFERENT THINGS: NEARLY EVERY DAY
2. NOT BEING ABLE TO STOP OR CONTROL WORRYING: NEARLY EVERY DAY
GAD7 TOTAL SCORE: 14
5. BEING SO RESTLESS THAT IT IS HARD TO SIT STILL: SEVERAL DAYS
6. BECOMING EASILY ANNOYED OR IRRITABLE: MORE THAN HALF THE DAYS
1. FEELING NERVOUS, ANXIOUS, OR ON EDGE: NEARLY EVERY DAY

## 2021-05-11 ASSESSMENT — PATIENT HEALTH QUESTIONNAIRE - PHQ9
5. POOR APPETITE OR OVEREATING: MORE THAN HALF THE DAYS
SUM OF ALL RESPONSES TO PHQ QUESTIONS 1-9: 4

## 2021-05-11 NOTE — PROGRESS NOTES
Chase is a 50 year old who is being evaluated via a billable telephone visit.      What phone number would you like to be contacted at? [phone]  How would you like to obtain your AVS? MyChart    Assessment & Plan     Irritable bowel syndrome with diarrhea  Patient has been having IBS, he tried paxil with side effects and stopped the medication.  Had a work up done and nothing was found.  Feeling better after stopping paxil however still has some anxiety.  Has tried citracil without relief.  Has not tried any otc diarrhea medications.  Discussed using imodium prn when leaving the home when he has more symptoms, he will try IB Tio which otc supplement which has methycellulose in it and explained to him about supplement medications.      Anxiety  Offered counseling with Liliane Ramsey and or trial of Effexor XR 37.5 mg a day.  He will hold on these medications.               See Patient Instructions    No follow-ups on file.    Raoul Kaufman MD  [facility]    Subjective   Chase is a 50 year old who presents for the following health issues:    HPI     Discuss Citrucel and paxil (pt discontinued paxil).    ED/UC Followup:    Facility: [facility] Emergency Dept   Date of visit: 4/23/2021    Reason for visit: LUQ abdominal pain  Current Status: Pt reports still having abdominal pain, pain comes and goes. Pt reports not taking medication anymore for anxiety.       Anxiety Follow-Up    How are you doing with your anxiety since your last visit? No change    Are you having other symptoms that might be associated with anxiety? Yes:  hard time going out and about     Have you had a significant life event? Grief or Loss grandma passed away a couple days and had no motivation with medication. Medication took away appetite, lost about 25 pounds in the last 4 months (form feeling nervous and medication).      Are you feeling depressed? No    Do you have any concerns with your use of  alcohol or other drugs? No    Social History     Tobacco Use     Smoking status: Never Smoker     Smokeless tobacco: Current User     Types: Chew     Tobacco comment: tin lasts 4 days   Substance Use Topics     Alcohol use: Yes     Alcohol/week: 0.0 standard drinks     Comment: 4-6 per week     Drug use: Never     BOZENA-7 SCORE 2/8/2021 3/8/2021 4/5/2021   Total Score 9 8 4     PHQ 2/8/2021 3/8/2021   PHQ-9 Total Score 3 8   Q9: Thoughts of better off dead/self-harm past 2 weeks Not at all Not at all           How many servings of fruits and vegetables do you eat daily?  2-3    On average, how many sweetened beverages do you drink each day (Examples: soda, juice, sweet tea, etc.  Do NOT count diet or artificially sweetened beverages)?   2    How many days per week do you exercise enough to make your heart beat faster? 5    How many minutes a day do you exercise enough to make your heart beat faster? 20 - 29    How many days per week do you miss taking your medication? 0      Review of Systems   He has been finding that he has more anxiety outside his home and more with crowds.  He states that he feels gassy and may have loose stools.  Has not lost his stool.  He has been avoiding food before leaving the house.  At home he is able to eat a more liberal diet.       Objective           Vitals:  No vitals were obtained today due to virtual visit.    Physical Exam   healthy, alert and no distress  PSYCH: Alert and oriented times 3; coherent speech, normal   rate and volume, able to articulate logical thoughts, able   to abstract reason, no tangential thoughts, no hallucinations   or delusions  His affect is normal  RESP: No cough, no audible wheezing, able to talk in full sentences  Remainder of exam unable to be completed due to telephone visits            Phone call duration: 25 minutes

## 2021-05-11 NOTE — PATIENT INSTRUCTIONS
Please try the Imodium over the counter before leaving your home to see if his helps with your symptoms.    If this does not work, I would consider a referral to [facility] and or start the low dose Effexor ER at 37.5 mg a day.    Please let me know how you are feeling.          Thank you for choosing [facility].  You may be receiving an email and/or telephone survey request from [facility] Customer Experience regarding your visit today.  Please take a few minutes to respond to the survey to let us know how we are doing.      If you have questions or concerns, please contact us via [employer] or you can contact your care team at [phone].    Our Clinic hours are:  Monday 6:40 am  to 7:00 pm  Tuesday -Friday 6:40 am to 5:00 pm    The [place] outpatient lab hours are:  Monday - Friday 6:10 am to 4:45 pm  Saturdays 7:00 am to 11:00 am  Appointments are required, call [phone]    If you have clinical questions after hours or would like to schedule an appointment,  call the clinic at [phone].     normal sinus rhythm

## 2021-05-12 ASSESSMENT — ANXIETY QUESTIONNAIRES: GAD7 TOTAL SCORE: 14

## 2021-05-14 ENCOUNTER — MYC MEDICAL ADVICE (OUTPATIENT)
Dept: FAMILY MEDICINE | Facility: CLINIC | Age: 51
End: 2021-05-14

## 2021-05-16 ENCOUNTER — MYC MEDICAL ADVICE (OUTPATIENT)
Dept: FAMILY MEDICINE | Facility: CLINIC | Age: 51
End: 2021-05-16

## 2021-05-16 DIAGNOSIS — F41.9 ANXIETY: Primary | ICD-10-CM

## 2021-05-17 RX ORDER — VENLAFAXINE HYDROCHLORIDE 37.5 MG/1
CAPSULE, EXTENDED RELEASE ORAL
Qty: 74 CAPSULE | Refills: 1 | Status: SHIPPED | OUTPATIENT
Start: 2021-05-17 | End: 2021-05-24

## 2021-05-17 NOTE — TELEPHONE ENCOUNTER
Yes, Sent the effexor 37.5mg to start for 14 days then increase to 75mg daily.  Do schedule a follow up appointment with Dr. Kaufman in 5-6 weeks after starting medication.  Send him a MyChart in 2 weeks to notify if any side effects and if they are still ongoing or have stopped.  Thank you,  Brett Harris MD

## 2021-05-17 NOTE — TELEPHONE ENCOUNTER
Covering for primary/ordering provider:  I see he sent a few more messages regarding side effects of Paxil. During his last visit, Dr. Kaufman recommend he try Effexor instead. If he would like to try this, I can send a prescription. All anti depressants can have similar side effect for a short time, a lot of times they will improve after 1-2 weeks on the medication. He could also consider counseling, this was also discussed during his last visit.

## 2021-05-17 NOTE — TELEPHONE ENCOUNTER
Patient returns call with questions, states the dose of Effexor seems high compared to other medications he's taken. RN educated that dosing isn't equivalent among medications and that 37.5 mg is a lower dose for this medication, as it can ultimately be increased up to 225 mg daily for adults (per [employer]).     He was also wondering about common side effects, so RN advised on stomach upset, dizziness, dry mouth, etc., just as many other similar medications. He states he will try it, but is wondering if he can stop abruptly if he experiences side effects.  RN advised that it takes a couple of weeks for the medication to take full effect, and weaning is generally recommended to prevent symptoms of withdrawal.      Advised patient to reach out should he think he's having side effects and MyChart us in a couple weeks to let us know how it's going, then f/u with PCP in about a month.  Understanding voiced.     Chelle So RN  [facility]

## 2021-05-17 NOTE — TELEPHONE ENCOUNTER
"S-(situation): Returned calls with my chart concerns for sensitivity to Paxil    B-(background): Patient was seen 5/11. He did a trial of 1/2 dose paxil (10mg) over weekend and now feels\" miserable\". He describes his symptoms as upset stomach, anxiety increase and feels like a super bal in a ball bouncing every where. He is calling for advise, next step.    A-(assessment): Patient does not feel out of control. He has in past and has sought medical attention in the er. Not there at this point. He denies wanting to harm self.    R-(recommendations): HE would like to try suggested Effexor per Dr Kaufman note 5/11.   Will route to out of office pool for consideration.      "

## 2021-05-17 NOTE — TELEPHONE ENCOUNTER
Message left for patient to return call regarding medication instructions and follow up.  Please see message below

## 2021-05-19 ENCOUNTER — MYC MEDICAL ADVICE (OUTPATIENT)
Dept: FAMILY MEDICINE | Facility: CLINIC | Age: 51
End: 2021-05-19

## 2021-05-19 NOTE — TELEPHONE ENCOUNTER
"I called pt.  He is reporting side effects to Effexor after his second dose of generalized body tingling, a rushing sensation over his body, and immediate diarrhea.  Effexor was prescribed for anxiety.    Pt intends to stop Effexor.  Pt does not want to be on any medication for now and to take a break over the next week.  He will message next week for further instructions.    Pt reminds that he had similar symptoms on Lexapro and he did not tolerate Paxil (Paxil caused him to feel \"miserable and no appetite.\"    Pt made appt with Dr Kaufman on 6/14/21.      Pt will send updated [employer] message next week and follow up as arranged on 6/14.    Lety Bustamante RN    "

## 2021-05-21 ENCOUNTER — MYC MEDICAL ADVICE (OUTPATIENT)
Dept: FAMILY MEDICINE | Facility: CLINIC | Age: 51
End: 2021-05-21

## 2021-05-22 ENCOUNTER — MYC MEDICAL ADVICE (OUTPATIENT)
Dept: FAMILY MEDICINE | Facility: CLINIC | Age: 51
End: 2021-05-22

## 2021-05-24 ENCOUNTER — E-VISIT (OUTPATIENT)
Dept: FAMILY MEDICINE | Facility: CLINIC | Age: 51
End: 2021-05-24
Payer: COMMERCIAL

## 2021-05-24 DIAGNOSIS — F41.9 ANXIETY: Primary | ICD-10-CM

## 2021-05-24 PROCEDURE — 99421 OL DIG E/M SVC 5-10 MIN: CPT | Performed by: FAMILY MEDICINE

## 2021-05-24 RX ORDER — MIRTAZAPINE 7.5 MG/1
7.5 TABLET, FILM COATED ORAL AT BEDTIME
Qty: 30 TABLET | Refills: 0 | Status: SHIPPED | OUTPATIENT
Start: 2021-05-24 | End: 2021-06-01 | Stop reason: DRUGHIGH

## 2021-05-24 ASSESSMENT — ANXIETY QUESTIONNAIRES
4. TROUBLE RELAXING: MORE THAN HALF THE DAYS
6. BECOMING EASILY ANNOYED OR IRRITABLE: SEVERAL DAYS
GAD7 TOTAL SCORE: 9
5. BEING SO RESTLESS THAT IT IS HARD TO SIT STILL: SEVERAL DAYS
2. NOT BEING ABLE TO STOP OR CONTROL WORRYING: MORE THAN HALF THE DAYS
1. FEELING NERVOUS, ANXIOUS, OR ON EDGE: SEVERAL DAYS
7. FEELING AFRAID AS IF SOMETHING AWFUL MIGHT HAPPEN: NOT AT ALL
3. WORRYING TOO MUCH ABOUT DIFFERENT THINGS: MORE THAN HALF THE DAYS
7. FEELING AFRAID AS IF SOMETHING AWFUL MIGHT HAPPEN: NOT AT ALL
GAD7 TOTAL SCORE: 9

## 2021-05-24 NOTE — TELEPHONE ENCOUNTER
E visit was completed with instructions for new medication sent to his pharmacy.  Raoul Kaufman MD  Family Medicine

## 2021-05-24 NOTE — PATIENT INSTRUCTIONS
Thank you for choosing us for your care. I have placed an order for a prescription so that you can start treatment. View your full visit summary for details by clicking on the link below. Your pharmacist will able to address any questions you may have about the medication.      If you're not feeling better within 4-6 weeks please schedule an appointment.  You can schedule an appointment right here in [facility], or call [phone]  If the visit is for the same symptoms as your eVisit, we'll refund the cost of your eVisit if seen within seven days.    Carlos Alberto Stone,  I have ordered the mirtazapine medication.  This medication is used mainly for depression.  There is a note that there is an off label use for anxiety.  I have sent through a trial of this medication at the lowest dose at 7.5 mg at night.  Please try this medication since you have not responded well to the other medications that have been sent to the pharmacy.  Follow up as planned.  If this medication does not agree with you, I am recommending that you see someone in psychiatry field since you have had so many reactions to the usual medication categories.  Sincerely,  Raoul Kaufman MD

## 2021-05-25 ASSESSMENT — ANXIETY QUESTIONNAIRES: GAD7 TOTAL SCORE: 9

## 2021-05-31 ENCOUNTER — MYC MEDICAL ADVICE (OUTPATIENT)
Dept: FAMILY MEDICINE | Facility: CLINIC | Age: 51
End: 2021-05-31

## 2021-05-31 DIAGNOSIS — F41.9 ANXIETY: Primary | ICD-10-CM

## 2021-06-01 RX ORDER — MIRTAZAPINE 15 MG/1
15 TABLET, FILM COATED ORAL AT BEDTIME
Qty: 30 TABLET | Refills: 0 | Status: SHIPPED | OUTPATIENT
Start: 2021-06-01 | End: 2021-06-14

## 2021-06-01 NOTE — TELEPHONE ENCOUNTER
I can raise the medication to 15 mg with the plan on a virtual visit in 3 weeks.  I have sent the new dose to his pharmacy.  Please schedule that appointment.  Raoul Kaufman MD  Family Medicine

## 2021-06-14 ENCOUNTER — OFFICE VISIT (OUTPATIENT)
Dept: FAMILY MEDICINE | Facility: CLINIC | Age: 51
End: 2021-06-14
Payer: COMMERCIAL

## 2021-06-14 VITALS
DIASTOLIC BLOOD PRESSURE: 70 MMHG | OXYGEN SATURATION: 97 % | RESPIRATION RATE: 16 BRPM | HEART RATE: 78 BPM | TEMPERATURE: 98.5 F | WEIGHT: 183 LBS | BODY MASS INDEX: 24.79 KG/M2 | SYSTOLIC BLOOD PRESSURE: 130 MMHG | HEIGHT: 72 IN

## 2021-06-14 DIAGNOSIS — F41.9 ANXIETY: ICD-10-CM

## 2021-06-14 PROCEDURE — 99213 OFFICE O/P EST LOW 20 MIN: CPT | Performed by: FAMILY MEDICINE

## 2021-06-14 RX ORDER — MIRTAZAPINE 15 MG/1
15 TABLET, FILM COATED ORAL AT BEDTIME
Qty: 90 TABLET | Refills: 3 | Status: SHIPPED | OUTPATIENT
Start: 2021-06-14 | End: 2021-10-27

## 2021-06-14 ASSESSMENT — ANXIETY QUESTIONNAIRES
IF YOU CHECKED OFF ANY PROBLEMS ON THIS QUESTIONNAIRE, HOW DIFFICULT HAVE THESE PROBLEMS MADE IT FOR YOU TO DO YOUR WORK, TAKE CARE OF THINGS AT HOME, OR GET ALONG WITH OTHER PEOPLE: SOMEWHAT DIFFICULT
6. BECOMING EASILY ANNOYED OR IRRITABLE: MORE THAN HALF THE DAYS
GAD7 TOTAL SCORE: 8
3. WORRYING TOO MUCH ABOUT DIFFERENT THINGS: SEVERAL DAYS
7. FEELING AFRAID AS IF SOMETHING AWFUL MIGHT HAPPEN: NOT AT ALL
5. BEING SO RESTLESS THAT IT IS HARD TO SIT STILL: NOT AT ALL
1. FEELING NERVOUS, ANXIOUS, OR ON EDGE: NEARLY EVERY DAY
2. NOT BEING ABLE TO STOP OR CONTROL WORRYING: SEVERAL DAYS

## 2021-06-14 ASSESSMENT — PATIENT HEALTH QUESTIONNAIRE - PHQ9
SUM OF ALL RESPONSES TO PHQ QUESTIONS 1-9: 2
5. POOR APPETITE OR OVEREATING: SEVERAL DAYS

## 2021-06-14 ASSESSMENT — MIFFLIN-ST. JEOR: SCORE: 1718.08

## 2021-06-14 NOTE — PATIENT INSTRUCTIONS
I have refilled your for 90 days at a time.    Your anxiety seems to be improved.    Follow up in 3 months virtually.          Thank you for choosing [facility].  You may be receiving an email and/or telephone survey request from [facility] Customer Experience regarding your visit today.  Please take a few minutes to respond to the survey to let us know how we are doing.      If you have questions or concerns, please contact us via [facility] or you can contact your care team at [phone] option 2.    Our Clinic hours are:  Monday - Thursday 7am-6pm  Friday 7am-5pm    The [place] outpatient lab hours are:  Monday - Friday 7am-4:30pm    Appointments are required, call [phone]    If you have clinical questions after hours or would like to schedule an appointment,  call the clinic at [phone].

## 2021-06-14 NOTE — PROGRESS NOTES
"    Assessment & Plan     Anxiety  He is currently on remeron for anxiety and it seems to be working well.  He has been on the 15 mg dose for 2 weeks.  No side effects.  He is gaining weight.  Will continue this dose and recheck in 6 weeks or so virtually.  Refilled med for 90 days at a time refills for one year.  Likely be on this med for 6-12 months.    - mirtazapine (REMERON) 15 MG tablet; Take 1 tablet (15 mg) by mouth At Bedtime             See Patient Instructions    Return in about 3 months (around 9/14/2021) for Video Visit, Telephone visit.    Raoul Kaufman MD  [facility] CARROL Stone is a 50 year old who presents for the following health issues     HPI   Chief Complaint   Patient presents with     Anxiety     follow up on Mirtazapine      Health Maintenance     declined tdap - states he had a reaction last time - Whole arm was 'hot\",  and flu like symptoms -          Anxiety Follow-Up    How are you doing with your anxiety since your last visit? Improved     Are you having other symptoms that might be associated with anxiety? No    Have you had a significant life event? Grief or Loss - Grandmother passed away      Are you feeling depressed? No    Do you have any concerns with your use of alcohol or other drugs? No    Social History     Tobacco Use     Smoking status: Never Smoker     Smokeless tobacco: Current User     Types: Chew     Tobacco comment: tin lasts 4 days   Substance Use Topics     Alcohol use: Yes     Alcohol/week: 0.0 standard drinks     Comment: 8-10 beers on the weekends      Drug use: Never     BOZENA-7 SCORE 4/5/2021 5/11/2021 5/24/2021   Total Score - - 9 (mild anxiety)   Total Score 4 14 9     PHQ 2/8/2021 3/8/2021 5/11/2021   PHQ-9 Total Score 3 8 4   Q9: Thoughts of better off dead/self-harm past 2 weeks Not at all Not at all Not at all     Reviewed gad7      How many servings of fruits and vegetables do you eat daily?  2-3    On average, how many " sweetened beverages do you drink each day (Examples: soda, juice, sweet tea, etc.  Do NOT count diet or artificially sweetened beverages)?   2    How many days per week do you exercise enough to make your heart beat faster? Yard work, Physical labor at work    How many minutes a day do you exercise enough to make your heart beat faster? 60 or more    How many days per week do you miss taking your medication? 0        Review of Systems   Review Of Systems  Skin: negative  Eyes:   Ears/Nose/Throat:   Respiratory: No shortness of breath, dyspnea on exertion, cough, or hemoptysis  Cardiovascular: negative  Gastrointestinal: still having intermittent diarrhea.  Using imodium, it helps using it prior to going somewhere.    Genitourinary: negative  Musculoskeletal:   Neurologic:   Psychiatric:   Hematologic/Lymphatic/Immunologic:   Endocrine:   Wt Readings from Last 4 Encounters:   06/14/21 83 kg (183 lb)   04/23/21 81.6 kg (180 lb)   04/05/21 80.5 kg (177 lb 6.4 oz)   03/02/21 83.9 kg (185 lb)           Objective    /70 (BP Location: Right arm, Patient Position: Chair, Cuff Size: Adult Regular)   Pulse 78   Temp 98.5  F (36.9  C) (Tympanic)   Resp 16   Ht 1.829 m (6')   Wt 83 kg (183 lb)   SpO2 97%   BMI 24.82 kg/m    Body mass index is 24.82 kg/m .  Physical Exam   GENERAL APPEARANCE: healthy, alert and no distress  RESP: lungs clear to auscultation - no rales, rhonchi or wheezes  CV: regular rates and rhythm, normal S1 S2, no S3 or S4 and no murmur, click or rub  MS: extremities normal- no gross deformities noted  SKIN: no suspicious lesions or rashes  NEURO: Normal strength and tone, mentation intact and speech normal  PSYCH: mentation appears normal and affect normal/bright

## 2021-06-15 ASSESSMENT — ANXIETY QUESTIONNAIRES: GAD7 TOTAL SCORE: 8

## 2021-07-19 ENCOUNTER — MYC MEDICAL ADVICE (OUTPATIENT)
Dept: FAMILY MEDICINE | Facility: CLINIC | Age: 51
End: 2021-07-19

## 2021-08-24 ENCOUNTER — VIRTUAL VISIT (OUTPATIENT)
Dept: FAMILY MEDICINE | Facility: CLINIC | Age: 51
End: 2021-08-24
Payer: COMMERCIAL

## 2021-08-24 ENCOUNTER — MYC MEDICAL ADVICE (OUTPATIENT)
Dept: FAMILY MEDICINE | Facility: CLINIC | Age: 51
End: 2021-08-24

## 2021-08-24 DIAGNOSIS — F41.9 ANXIETY: ICD-10-CM

## 2021-08-24 DIAGNOSIS — K58.0 IRRITABLE BOWEL SYNDROME WITH DIARRHEA: Primary | ICD-10-CM

## 2021-08-24 PROCEDURE — 99213 OFFICE O/P EST LOW 20 MIN: CPT | Mod: 95 | Performed by: FAMILY MEDICINE

## 2021-08-24 RX ORDER — LOPERAMIDE HYDROCHLORIDE 2 MG/1
2 TABLET ORAL DAILY PRN
Qty: 30 TABLET | Refills: 3 | Status: SHIPPED | OUTPATIENT
Start: 2021-08-24 | End: 2022-01-13

## 2021-08-24 RX ORDER — MIRTAZAPINE 15 MG/1
22.5 TABLET, FILM COATED ORAL AT BEDTIME
Qty: 135 TABLET | Refills: 2 | Status: SHIPPED | OUTPATIENT
Start: 2021-08-24 | End: 2021-10-27

## 2021-08-24 ASSESSMENT — ANXIETY QUESTIONNAIRES
3. WORRYING TOO MUCH ABOUT DIFFERENT THINGS: NEARLY EVERY DAY
5. BEING SO RESTLESS THAT IT IS HARD TO SIT STILL: NOT AT ALL
6. BECOMING EASILY ANNOYED OR IRRITABLE: SEVERAL DAYS
2. NOT BEING ABLE TO STOP OR CONTROL WORRYING: NEARLY EVERY DAY
7. FEELING AFRAID AS IF SOMETHING AWFUL MIGHT HAPPEN: NOT AT ALL
GAD7 TOTAL SCORE: 11
1. FEELING NERVOUS, ANXIOUS, OR ON EDGE: NEARLY EVERY DAY

## 2021-08-24 ASSESSMENT — PATIENT HEALTH QUESTIONNAIRE - PHQ9: 5. POOR APPETITE OR OVEREATING: SEVERAL DAYS

## 2021-08-24 NOTE — PATIENT INSTRUCTIONS
For your anxiety please try increasing your remeron medication to 22.5 mg a day.    Follow up in 2 months virtually.    If you have side effects go back to 15 mg a day.    If you have constipation please hold the imodium tablet for a few days and then restart after you start having regular stools.          Thank you for choosing [facility].  You may be receiving an email and/or telephone survey request from [facility] beds at the [facility] Customer Experience regarding your visit today.  Please take a few minutes to respond to the survey to let us know how we are doing.      If you have questions or concerns, please contact us via [employer] or you can contact your care team at [phone] option 2.    Our Clinic hours are:  Monday - Thursday 7am-6pm  Friday 7am-5pm    The [place] outpatient lab hours are:  Monday - Friday 7am-4:30pm    Appointments are required, call [phone]    If you have clinical questions after hours or would like to schedule an appointment,  call the clinic at [phone].     [facility]

## 2021-08-24 NOTE — PROGRESS NOTES
Chase is a 51 year old who is being evaluated via a billable telephone visit.      What phone number would you like to be contacted at? [phone]  How would you like to obtain your AVS? MyChart    Assessment & Plan     Anxiety  Discussed anxiety.  He is doing the same as when he went to 15 mg. Still has social anxiety. Likes staying home.  Work has been stressful too however enlightening since they had a presentation on anxiety at work and finding out that he is not alone.  He is still seeing his counseling.  Nothing earth shattering in how to deal with anxiety.      We discussed options and decided on going to 22.5 mg a day and recheck in 2 months.  He will schedule the virtual visit.  New prescription is sent to his pharmacy.   - mirtazapine (REMERON) 15 MG tablet; Take 1.5 tablets (22.5 mg) by mouth At Bedtime             See Patient Instructions    No follow-ups on file.    Raoul Kaufman MD  [facility]   Chase is a 51 year old who presents for the following health issues     HPI     Anxiety Follow-Up    How are you doing with your anxiety since your last visit? No change    Are you having other symptoms that might be associated with anxiety? No    Have you had a significant life event? No     Are you feeling depressed? No    Do you have any concerns with your use of alcohol or other drugs? No    Social History     Tobacco Use     Smoking status: Never Smoker     Smokeless tobacco: Current User     Types: Chew     Tobacco comment: tin lasts 4 days   Substance Use Topics     Alcohol use: Yes     Alcohol/week: 0.0 standard drinks     Comment: 8-10 beers on the weekends      Drug use: Never     BOZENA-7 SCORE 5/24/2021 6/14/2021 8/24/2021   Total Score 9 (mild anxiety) - -   Total Score 9 8 11     PHQ 3/8/2021 5/11/2021 6/14/2021   PHQ-9 Total Score 8 4 2   Q9: Thoughts of better off dead/self-harm past 2 weeks Not at all Not at all Not at all     Last PHQ-9 6/14/2021   1.   Little interest or pleasure in doing things 0   2.  Feeling down, depressed, or hopeless 0   3.  Trouble falling or staying asleep, or sleeping too much 1   4.  Feeling tired or having little energy 1   5.  Poor appetite or overeating 0   6.  Feeling bad about yourself 0   7.  Trouble concentrating 0   8.  Moving slowly or restless 0   Q9: Thoughts of better off dead/self-harm past 2 weeks 0   PHQ-9 Total Score 2   Difficulty at work, home, or with people Somewhat difficult     BOZENA-7  8/24/2021   1. Feeling nervous, anxious, or on edge 3   2. Not being able to stop or control worrying 3   3. Worrying too much about different things 3   4. Trouble relaxing 1   5. Being so restless that it is hard to sit still 0   6. Becoming easily annoyed or irritable 1   7. Feeling afraid, as if something awful might happen 0   BOZENA-7 Total Score 11   If you checked any problems, how difficult have they made it for you to do your work, take care of things at home, or get along with other people? -         How many servings of fruits and vegetables do you eat daily?  2-3    On average, how many sweetened beverages do you drink each day (Examples: soda, juice, sweet tea, etc.  Do NOT count diet or artificially sweetened beverages)?   3    How many days per week do you exercise enough to make your heart beat faster? 3 or less    How many minutes a day do you exercise enough to make your heart beat faster? 9 or less     Has a physical job    How many days per week do you miss taking your medication? 0        Review of Systems         Objective           Vitals:  No vitals were obtained today due to virtual visit.    Physical Exam   healthy, alert and no distress  PSYCH: Alert and oriented times 3; coherent speech, normal   rate and volume, able to articulate logical thoughts, able   to abstract reason, no tangential thoughts, no hallucinations   or delusions  His affect is normal  RESP: No cough, no audible wheezing, able to talk in  full sentences  Remainder of exam unable to be completed due to telephone visits                Phone call duration: 26 minutes

## 2021-08-25 ASSESSMENT — ANXIETY QUESTIONNAIRES: GAD7 TOTAL SCORE: 11

## 2021-08-25 NOTE — TELEPHONE ENCOUNTER
Patient is contacted and told of the Rx and if his insurance does not pay he will need to get the generic and use this. Kenya SALGADO RN

## 2021-08-25 NOTE — TELEPHONE ENCOUNTER
Please call patient,    I sent a prescription to see if they will pay for it.    Imodium is over the counter.    If they do not pay for it, I would buy the generic version to make it the cheapest.    Raoul Kaufman MD  Family Medicine

## 2021-09-18 ENCOUNTER — HEALTH MAINTENANCE LETTER (OUTPATIENT)
Age: 51
End: 2021-09-18

## 2021-10-04 ENCOUNTER — MYC MEDICAL ADVICE (OUTPATIENT)
Dept: FAMILY MEDICINE | Facility: CLINIC | Age: 51
End: 2021-10-04

## 2021-10-04 ENCOUNTER — E-VISIT (OUTPATIENT)
Dept: URGENT CARE | Facility: URGENT CARE | Age: 51
End: 2021-10-04
Payer: COMMERCIAL
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
## 2021-10-04 PROCEDURE — 99421 OL DIG E/M SVC 5-10 MIN: CPT | Performed by: FAMILY MEDICINE

## 2021-10-04 RX ORDER — DOXYCYCLINE 100 MG/1
TABLET ORAL
Qty: 2 TABLET | Refills: 0 | Status: SHIPPED | OUTPATIENT
Start: 2021-10-04 | End: 2021-10-27

## 2021-10-04 NOTE — TELEPHONE ENCOUNTER
Pt had Deer Tick bite in 2 areas on Friday.  He dug in the one on his stomach with a needle.  Area is very red and the size of a pencil eraser.  It is swollen  No fever  Ticks were on for almost 48 hours he estimated.  He will make an e visit on MY Chart with Urgent Care.

## 2021-10-27 ENCOUNTER — VIRTUAL VISIT (OUTPATIENT)
Dept: FAMILY MEDICINE | Facility: CLINIC | Age: 51
End: 2021-10-27
Payer: COMMERCIAL

## 2021-10-27 DIAGNOSIS — K58.0 IRRITABLE BOWEL SYNDROME WITH DIARRHEA: ICD-10-CM

## 2021-10-27 DIAGNOSIS — F41.9 ANXIETY: Primary | ICD-10-CM

## 2021-10-27 PROCEDURE — 99213 OFFICE O/P EST LOW 20 MIN: CPT | Mod: 95 | Performed by: FAMILY MEDICINE

## 2021-10-27 PROCEDURE — 96127 BRIEF EMOTIONAL/BEHAV ASSMT: CPT | Mod: 59 | Performed by: FAMILY MEDICINE

## 2021-10-27 RX ORDER — MIRTAZAPINE 15 MG/1
15 TABLET, FILM COATED ORAL AT BEDTIME
Qty: 90 TABLET | Refills: 3 | Status: SHIPPED | OUTPATIENT
Start: 2021-10-27 | End: 2022-07-13

## 2021-10-27 ASSESSMENT — ANXIETY QUESTIONNAIRES
1. FEELING NERVOUS, ANXIOUS, OR ON EDGE: NEARLY EVERY DAY
5. BEING SO RESTLESS THAT IT IS HARD TO SIT STILL: NOT AT ALL
3. WORRYING TOO MUCH ABOUT DIFFERENT THINGS: SEVERAL DAYS
GAD7 TOTAL SCORE: 7
6. BECOMING EASILY ANNOYED OR IRRITABLE: SEVERAL DAYS
2. NOT BEING ABLE TO STOP OR CONTROL WORRYING: SEVERAL DAYS
IF YOU CHECKED OFF ANY PROBLEMS ON THIS QUESTIONNAIRE, HOW DIFFICULT HAVE THESE PROBLEMS MADE IT FOR YOU TO DO YOUR WORK, TAKE CARE OF THINGS AT HOME, OR GET ALONG WITH OTHER PEOPLE: SOMEWHAT DIFFICULT
7. FEELING AFRAID AS IF SOMETHING AWFUL MIGHT HAPPEN: SEVERAL DAYS

## 2021-10-27 ASSESSMENT — PATIENT HEALTH QUESTIONNAIRE - PHQ9
SUM OF ALL RESPONSES TO PHQ QUESTIONS 1-9: 7
5. POOR APPETITE OR OVEREATING: NOT AT ALL

## 2021-10-27 NOTE — PROGRESS NOTES
Chase is a 51 year old who is being evaluated via a billable telephone visit.      What phone number would you like to be contacted at? [phone]  How would you like to obtain your AVS? MyChart    Assessment & Plan     Anxiety  Patient thinks he is doing ok with anxiety.  He is seeing his counselor however feels this could be cut back since it seems to be more maintenance.    He is having more fatigue.  He tried the 22.5 mg of remeron however felt really fatigued.  He still has fatigue.  He feels he is sleeping well. No snoring or apnea that he knows of at this time.    He is wondering about decreasing the remeron to 7.5 mg a day.     - mirtazapine (REMERON) 15 MG tablet; Take 1 tablet (15 mg) by mouth At Bedtime Hold on file until needed.    Irritable bowel syndrome with diarrhea  Controlled with over the counter medication.               See Patient Instructions    No follow-ups on file.    Raoul Kaufman MD  [facility]   Chase is a 51 year old who presents for the following health issues     HPI       Anxiety Follow-Up    How are you doing with your anxiety since your last visit? Improved, is feeling more tired and would like to discuss psychologist visits. He has been seeing him regularly.        Are you having other symptoms that might be associated with anxiety? Yes:  sleeping more then usual     Have you had a significant life event? No     Are you feeling depressed? No    Do you have any concerns with your use of alcohol or other drugs? No    Social History     Tobacco Use     Smoking status: Never Smoker     Smokeless tobacco: Current User     Types: Chew     Tobacco comment: tin lasts 4 days   Substance Use Topics     Alcohol use: Yes     Alcohol/week: 0.0 standard drinks     Comment: 8-10 beers on the weekends      Drug use: Never     BOZENA-7 SCORE 5/24/2021 6/14/2021 8/24/2021   Total Score 9 (mild anxiety) - -   Total Score 9 8 11     PHQ 3/8/2021 5/11/2021  6/14/2021   PHQ-9 Total Score 8 4 2   Q9: Thoughts of better off dead/self-harm past 2 weeks Not at all Not at all Not at all     Last PHQ-9 10/27/2021   1.  Little interest or pleasure in doing things 1   2.  Feeling down, depressed, or hopeless 0   3.  Trouble falling or staying asleep, or sleeping too much 2   4.  Feeling tired or having little energy 3   5.  Poor appetite or overeating 0   6.  Feeling bad about yourself 0   7.  Trouble concentrating 1   8.  Moving slowly or restless 0   Q9: Thoughts of better off dead/self-harm past 2 weeks 0   PHQ-9 Total Score 7   Difficulty at work, home, or with people Somewhat difficult     BOZENA-7  10/27/2021   1. Feeling nervous, anxious, or on edge 3   2. Not being able to stop or control worrying 1   3. Worrying too much about different things 1   4. Trouble relaxing 0   5. Being so restless that it is hard to sit still 0   6. Becoming easily annoyed or irritable 1   7. Feeling afraid, as if something awful might happen 1   BOZENA-7 Total Score 7   If you checked any problems, how difficult have they made it for you to do your work, take care of things at home, or get along with other people? Somewhat difficult         How many servings of fruits and vegetables do you eat daily?  2-3    On average, how many sweetened beverages do you drink each day (Examples: soda, juice, sweet tea, etc.  Do NOT count diet or artificially sweetened beverages)?   3    How many days per week do you exercise enough to make your heart beat faster? 5    How many minutes a day do you exercise enough to make your heart beat faster? 60 or more    How many days per week do you miss taking your medication? 0        Review of Systems         Objective           Vitals:  No vitals were obtained today due to virtual visit.    Physical Exam   healthy, alert and no distress  PSYCH: Alert and oriented times 3; coherent speech, normal   rate and volume, able to articulate logical thoughts, able   to abstract  reason, no tangential thoughts, no hallucinations   or delusions  His affect is normal  RESP: No cough, no audible wheezing, able to talk in full sentences  Remainder of exam unable to be completed due to telephone visits                Phone call duration: 15 minutes

## 2021-10-28 ASSESSMENT — ANXIETY QUESTIONNAIRES: GAD7 TOTAL SCORE: 7

## 2021-10-28 NOTE — PATIENT INSTRUCTIONS
Please try decreasing your mirtazapine medicaiton to 7.5 mg a day.    See if this helps with your fatigue.  Please also check with your wife to see if you snore or stop breathing at night.  If you do you will need to have a sleep consult.          Thank you for choosing [facility].  You may be receiving an email and/or telephone survey request from [facility] Customer Experience regarding your visit today.  Please take a few minutes to respond to the survey to let us know how we are doing.      If you have questions or concerns, please contact us via [employer] or you can contact your care team at [phone] option 2.    Our Clinic hours are:  Monday - Thursday 7am-6pm  Friday 7am-5pm    The [place] outpatient lab hours are:  Monday - Friday 7am-4:30pm    Appointments are required, call [phone]    If you have clinical questions after hours or would like to schedule an appointment,  call the clinic at [phone].

## 2022-01-12 DIAGNOSIS — K58.0 IRRITABLE BOWEL SYNDROME WITH DIARRHEA: ICD-10-CM

## 2022-01-12 NOTE — TELEPHONE ENCOUNTER
Routing refill request to provider for review/approval because:  Drug not on the [facility] refill protocol     NATHALY Jenkins

## 2022-01-13 RX ORDER — LOPERAMIDE HYDROCHLORIDE 2 MG/1
2 TABLET ORAL DAILY PRN
Qty: 30 TABLET | Refills: 5 | Status: SHIPPED | OUTPATIENT
Start: 2022-01-13 | End: 2024-09-26

## 2022-01-17 ENCOUNTER — MYC MEDICAL ADVICE (OUTPATIENT)
Dept: FAMILY MEDICINE | Facility: CLINIC | Age: 52
End: 2022-01-17
Payer: COMMERCIAL

## 2022-01-17 DIAGNOSIS — K58.0 IRRITABLE BOWEL SYNDROME WITH DIARRHEA: Primary | ICD-10-CM

## 2022-01-18 NOTE — TELEPHONE ENCOUNTER
Forwarding MyChart update and request to PCP.  GI referral pended.    Chelle So RN  [facility]

## 2022-01-18 NOTE — TELEPHONE ENCOUNTER
Please call Chase,  I have done a referral to [facility].  I have referred him to [place] however they have sites all over the [place] [facility].  Please give him the phone number to call to set up an appointment.  Sincerely,  Raoul Kaufman MD

## 2022-01-20 ENCOUNTER — TRANSFERRED RECORDS (OUTPATIENT)
Dept: HEALTH INFORMATION MANAGEMENT | Facility: CLINIC | Age: 52
End: 2022-01-20
Payer: COMMERCIAL

## 2022-04-24 ENCOUNTER — HEALTH MAINTENANCE LETTER (OUTPATIENT)
Age: 52
End: 2022-04-24

## 2022-04-25 ENCOUNTER — TRANSFERRED RECORDS (OUTPATIENT)
Dept: HEALTH INFORMATION MANAGEMENT | Facility: CLINIC | Age: 52
End: 2022-04-25
Payer: COMMERCIAL

## 2022-05-18 ENCOUNTER — TRANSFERRED RECORDS (OUTPATIENT)
Dept: HEALTH INFORMATION MANAGEMENT | Facility: CLINIC | Age: 52
End: 2022-05-18
Payer: COMMERCIAL

## 2022-05-19 ENCOUNTER — TRANSFERRED RECORDS (OUTPATIENT)
Dept: HEALTH INFORMATION MANAGEMENT | Facility: CLINIC | Age: 52
End: 2022-05-19
Payer: COMMERCIAL

## 2022-06-21 ENCOUNTER — TRANSFERRED RECORDS (OUTPATIENT)
Dept: HEALTH INFORMATION MANAGEMENT | Facility: CLINIC | Age: 52
End: 2022-06-21

## 2022-07-07 ASSESSMENT — ENCOUNTER SYMPTOMS
EYE PAIN: 0
DIARRHEA: 1
NERVOUS/ANXIOUS: 1
ARTHRALGIAS: 0
SHORTNESS OF BREATH: 0
HEMATOCHEZIA: 0
MYALGIAS: 0
CHILLS: 0
HEMATURIA: 0
JOINT SWELLING: 0
PALPITATIONS: 0
SORE THROAT: 0
HEARTBURN: 0
DIZZINESS: 0
FEVER: 0
COUGH: 0
HEADACHES: 0
PARESTHESIAS: 0
WEAKNESS: 0
ABDOMINAL PAIN: 1
NAUSEA: 0
DYSURIA: 0
CONSTIPATION: 0
FREQUENCY: 0

## 2022-07-13 ENCOUNTER — OFFICE VISIT (OUTPATIENT)
Dept: FAMILY MEDICINE | Facility: CLINIC | Age: 52
End: 2022-07-13
Payer: COMMERCIAL

## 2022-07-13 VITALS
OXYGEN SATURATION: 98 % | DIASTOLIC BLOOD PRESSURE: 80 MMHG | HEIGHT: 72 IN | BODY MASS INDEX: 24.11 KG/M2 | WEIGHT: 178 LBS | HEART RATE: 82 BPM | TEMPERATURE: 98.4 F | SYSTOLIC BLOOD PRESSURE: 134 MMHG | RESPIRATION RATE: 16 BRPM

## 2022-07-13 DIAGNOSIS — L30.9 DERMATITIS: ICD-10-CM

## 2022-07-13 DIAGNOSIS — E78.5 HYPERLIPIDEMIA LDL GOAL <100: ICD-10-CM

## 2022-07-13 DIAGNOSIS — E78.1 HYPERTRIGLYCERIDEMIA: ICD-10-CM

## 2022-07-13 DIAGNOSIS — Z00.00 ROUTINE GENERAL MEDICAL EXAMINATION AT A HEALTH CARE FACILITY: Primary | ICD-10-CM

## 2022-07-13 DIAGNOSIS — Z12.5 SCREENING FOR PROSTATE CANCER: ICD-10-CM

## 2022-07-13 PROCEDURE — 99213 OFFICE O/P EST LOW 20 MIN: CPT | Mod: 25 | Performed by: FAMILY MEDICINE

## 2022-07-13 PROCEDURE — 99396 PREV VISIT EST AGE 40-64: CPT | Performed by: FAMILY MEDICINE

## 2022-07-13 RX ORDER — TRIAMCINOLONE ACETONIDE 1 MG/G
CREAM TOPICAL 2 TIMES DAILY PRN
Qty: 30 G | Refills: 1 | Status: SHIPPED | OUTPATIENT
Start: 2022-07-13 | End: 2023-04-03

## 2022-07-13 ASSESSMENT — ENCOUNTER SYMPTOMS
WEAKNESS: 0
NERVOUS/ANXIOUS: 1
ABDOMINAL PAIN: 1
COUGH: 0
ARTHRALGIAS: 0
JOINT SWELLING: 0
EYE PAIN: 0
FREQUENCY: 0
NAUSEA: 0
CONSTIPATION: 0
DYSURIA: 0
HEMATOCHEZIA: 0
HEADACHES: 0
SORE THROAT: 0
PARESTHESIAS: 0
DIZZINESS: 0
HEARTBURN: 0
PALPITATIONS: 0
MYALGIAS: 0
FEVER: 0
HEMATURIA: 0
SHORTNESS OF BREATH: 0
CHILLS: 0
DIARRHEA: 1

## 2022-07-13 ASSESSMENT — PAIN SCALES - GENERAL: PAINLEVEL: NO PAIN (1)

## 2022-07-13 NOTE — PATIENT INSTRUCTIONS
Please make a fasting lab visit.    I sent a steroid medication to your pharmacy to see if this will help with your mild dermatitis in your belly button region.    Please be aware that there will be an additional charge during your preventative visit due to either a new diagnosis and/or chronic disease management.    Preventative visits screen for diseases prior to they occur.  They do not cover for any new diagnosis or chronic disease management which would include medication refills, labs etc.    If you have questions regarding your coverage please check with your insurance provider.  At [facility] we need to code correctly to be in compliance with all insurance companies.      Thank you for choosing [facility].  You may be receiving an email and/or telephone survey request from [facility] Customer Experience regarding your visit today.  Please take a few minutes to respond to the survey to let us know how we are doing.      If you have questions or concerns, please contact us via [employer] or you can contact your care team at [phone] option 2.    Our Clinic hours are:  Monday - Thursday 7am-6pm  Friday 7am-5pm    The [place] outpatient lab hours are:  Monday - Friday 7am-4:30pm    Appointments are required, call [phone]    If you have clinical questions after hours or would like to schedule an appointment,  call the clinic at [phone].            Preventive Health Recommendations  Male Ages 50 - 64    Yearly exam:             See your health care provider every year in order to  o   Review health changes.   o   Discuss preventive care.    o   Review your medicines if your doctor has prescribed any.   Have a cholesterol test every 5 years, or more frequently if you are at risk for high cholesterol/heart disease.   Have a diabetes test (fasting glucose) every three years. If you are at risk for diabetes, you should have this test more often.   Have a colonoscopy at age 50, or have a yearly FIT test  (stool test). These exams will check for colon cancer.    Talk with your health care provider about whether or not a prostate cancer screening test (PSA) is right for you.  You should be tested each year for STDs (sexually transmitted diseases), if you re at risk.     Shots: Get a flu shot each year. Get a tetanus shot every 10 years.     Nutrition:  Eat at least 5 servings of fruits and vegetables daily.   Eat whole-grain bread, whole-wheat pasta and brown rice instead of white grains and rice.   Get adequate Calcium and Vitamin D.     Lifestyle  Exercise for at least 150 minutes a week (30 minutes a day, 5 days a week). This will help you control your weight and prevent disease.   Limit alcohol to one drink per day.   No smoking.   Wear sunscreen to prevent skin cancer.   See your dentist every six months for an exam and cleaning.   See your eye doctor every 1 to 2 years.

## 2022-07-13 NOTE — PROGRESS NOTES
SUBJECTIVE:   CC: Too Osman is an 51 year old male who presents for preventative health visit.     Patient has been advised of split billing requirements and indicates understanding: Yes     Healthy Habits:     Getting at least 3 servings of Calcium per day:  NO    Bi-annual eye exam:  NO    Dental care twice a year:  Yes    Sleep apnea or symptoms of sleep apnea:  Daytime drowsiness    Diet:  Regular (no restrictions)    Frequency of exercise:  1 day/week    Duration of exercise:  Less than 15 minutes    Taking medications regularly:  Yes    Barriers to taking medications:  None    Medication side effects:  Other    PHQ-2 Total Score: 2    Additional concerns today:  Yes (Would like to discuss continuing health over the past 18 months. Discuss [facility] results, did have some issues with prior authorization with insurance.)  Discuss blood work, not fasting today.               Today's PHQ-2 Score:   PHQ-2 ( 1999 Pf[employer]) 7/7/2022   Q1: Little interest or pleasure in doing things 1   Q2: Feeling down, depressed or hopeless 1   PHQ-2 Score 2   Q1: Little interest or pleasure in doing things Several days   Q2: Feeling down, depressed or hopeless Several days   PHQ-2 Score 2       Abuse: Current or Past(Physical, Sexual or Emotional)- No  Do you feel safe in your environment? Yes    Have you ever done Advance Care Planning? (For example, a Health Directive, POLST, or a discussion with a medical provider or your loved ones about your wishes): No, advance care planning information given to patient to review.  Patient declined advance care planning discussion at this time.    Social History     Tobacco Use     Smoking status: Never Smoker     Smokeless tobacco: Current User     Types: Chew     Tobacco comment: tin lasts 4 days   Substance Use Topics     Alcohol use: Yes     Alcohol/week: 0.0 standard drinks     Comment: 8-10 beers on the weekends      If you drink alcohol do you typically have >3 drinks per day or >7  drinks per week? Yes      Alcohol Use 7/13/2022   Prescreen: >3 drinks/day or >7 drinks/week? -   Prescreen: >3 drinks/day or >7 drinks/week? Yes   AUDIT SCORE  -       Last PSA:   PSA   Date Value Ref Range Status   01/26/2021 0.26 0 - 4 ug/L Final     Comment:     Assay Method:  Chemiluminescence using Siemens Vista analyzer       Reviewed orders with patient. Reviewed health maintenance and updated orders accordingly - Yes      Reviewed and updated as needed this visit by clinical staff   Tobacco  Allergies  Meds   Med Hx  Surg Hx  Fam Hx  Soc Hx          Reviewed and updated as needed this visit by Provider                       Review of Systems   Constitutional: Negative for chills and fever.   HENT: Negative for congestion, ear pain, hearing loss and sore throat.    Eyes: Negative for pain and visual disturbance.   Respiratory: Negative for cough and shortness of breath.    Cardiovascular: Negative for chest pain, palpitations and peripheral edema.   Gastrointestinal: Positive for abdominal pain and diarrhea. Negative for constipation, heartburn, hematochezia and nausea.   Genitourinary: Negative for dysuria, frequency, genital sores, hematuria, impotence, penile discharge and urgency.   Musculoskeletal: Negative for arthralgias, joint swelling and myalgias.   Skin: Negative for rash.   Neurological: Negative for dizziness, weakness, headaches and paresthesias.   Psychiatric/Behavioral: Negative for mood changes. The patient is nervous/anxious.          OBJECTIVE:   /80   Pulse 82   Temp 98.4  F (36.9  C) (Tympanic)   Resp 16   Ht 1.829 m (6')   Wt 80.7 kg (178 lb)   SpO2 98%   BMI 24.14 kg/m      Physical Exam  GENERAL: healthy, alert and no distress  EYES: Eyes grossly normal to inspection, PERRL and conjunctivae and sclerae normal  HENT: ear canals and TM's normal, nose and mouth without ulcers or lesions  NECK: no adenopathy, no asymmetry, masses, or scars and thyroid normal to  palpation  RESP: lungs clear to auscultation - no rales, rhonchi or wheezes  CV: regular rate and rhythm, normal S1 S2, no S3 or S4, no murmur, click or rub, no peripheral edema and peripheral pulses strong  ABDOMEN: soft, nontender, no hepatosplenomegaly, no masses and bowel sounds normal   (male): normal male genitalia without lesions or urethral discharge, no hernia  MS: no gross musculoskeletal defects noted, no edema  SKIN: no suspicious lesions or rashes  SKIN: mild small red macules to papules in umbilicus   NEURO: Normal strength and tone, mentation intact and speech normal  PSYCH: mentation appears normal, affect normal/bright  LYMPH: no cervical adenopathy        ASSESSMENT/PLAN:   (Z00.00) Routine general medical examination at a health care facility  (primary encounter diagnosis)  Comment: Discussed healthy lifestyle and preventative cares.    Plan:     (E78.1) Hypertriglyceridemia  Comment: need to check lab  Plan:     (Z12.5) Screening for prostate cancer  Comment:   Plan: Prostate Specific Antigen Screen            (E78.5) Hyperlipidemia LDL goal <100  Comment:   Plan: Lipid panel reflex to direct LDL Fasting            (L30.9) Dermatitis  Comment: noted and will treat steroid cream  Plan: triamcinolone (KENALOG) 0.1 % external cream,         OFFICE/OUTPT VISIT,[facility] III          Discussed colonoscopy next year.  Discussed his intermittent chronic diarrhea is improving, doing more lifestyle changes, recommend to monitor it at this time.      Patient has been advised of split billing requirements and indicates understanding: Yes    COUNSELING:   Reviewed preventive health counseling, as reflected in patient instructions       Regular exercise       Healthy diet/nutrition       Colorectal cancer screening       Prostate cancer screening    Estimated body mass index is 24.14 kg/m  as calculated from the following:    Height as of this encounter: 1.829 m (6').    Weight as of this encounter: 80.7 kg  (178 lb).         He reports that he has never smoked. His smokeless tobacco use includes chew.  Tobacco Cessation Action Plan:   Information offered: Patient not interested at this time      Counseling Resources:  ATP IV Guidelines  Pooled Cohorts Equation Calculator  FRAX Risk Assessment  ICSI Preventive Guidelines  Dietary Guidelines for Americans, 2010  USDA's MyPlate  ASA Prophylaxis  Lung CA Screening    Raoul Kaufman MD  [facility]

## 2022-11-19 ENCOUNTER — HEALTH MAINTENANCE LETTER (OUTPATIENT)
Age: 52
End: 2022-11-19

## 2023-04-03 ENCOUNTER — VIRTUAL VISIT (OUTPATIENT)
Dept: FAMILY MEDICINE | Facility: CLINIC | Age: 53
End: 2023-04-03
Payer: COMMERCIAL

## 2023-04-03 DIAGNOSIS — R22.2 ABDOMINAL WALL MASS: ICD-10-CM

## 2023-04-03 DIAGNOSIS — R53.83 OTHER FATIGUE: Primary | ICD-10-CM

## 2023-04-03 PROCEDURE — 99214 OFFICE O/P EST MOD 30 MIN: CPT | Mod: VID | Performed by: FAMILY MEDICINE

## 2023-04-03 NOTE — PROGRESS NOTES
Chase is a 52 year old who is being evaluated via a billable video visit.      How would you like to obtain your AVS? MyChart  If the video visit is dropped, the invitation should be resent by: Text to cell phone: [phone]  Will anyone else be joining your video visit? No        Assessment & Plan     Other fatigue  Unclear etiology, do labs.      Abdominal wall mass.  Also follow up in clinic for concern of mass in abdominal wall.  - Comprehensive metabolic panel; Future  - CBC with Platelets & Differential; Future  - TSH with free T4 reflex; Future  - Testosterone, total; Future  - Lyme Disease Total Abs Bld with Reflex to Confirm CLIA; Future             See Patient Instructions    Raoul Kaufman MD  [facility]    Subjective   Chase is a 52 year old, presenting for the following health issues:  Fatigue (Low energy) and Sinus Problem (Forehead and eyes)        4/3/2023     4:48 PM   Additional Questions   Roomed by Giuliana Lopez MA   Accompanied by Self         4/3/2023     4:48 PM   Patient Reported Additional Medications   Patient reports taking the following new medications no new meds     HPI     GI symptoms are getting better.  Has some symptoms on left side.  He is having more fatigue.      He is feeling more physical issues.  He does not think this is depression.   Tried meds for depression anxiety and side effect of the medication was worse.    He still reports that if his stomach is not feeling well he will not go out.  He is eating more normal food but does not have cravings.    He is feeling better but really wants to have labs done to make sure there is nothing more causing his fatigue.      Feels also like his libido is down.  Wondering if this is causing fatigue.  Reports also left testicle is smaller than right one.  Wondering if this is an issue.      Review of Systems   No fever or chills.  Abdominal symptoms are getting better.    Still has a mass and slight  discomfort on left side of abdomen.  Wants to have this further evaluated.    He reports also left testicle is smaller wondering if this is a cause of his symptoms.  Discussed testicle are a paired organ and people do get by with one testicle.        Objective           Vitals:  No vitals were obtained today due to virtual visit.    Physical Exam   GENERAL: Healthy, alert and no distress  EYES: Eyes grossly normal to inspection.  No discharge or erythema, or obvious scleral/conjunctival abnormalities.  RESP: No audible wheeze, cough, or visible cyanosis.  No visible retractions or increased work of breathing.    SKIN: Visible skin clear. No significant rash, abnormal pigmentation or lesions.  NEURO: Cranial nerves grossly intact.  Mentation and speech appropriate for age.  PSYCH: Mentation appears normal, affect normal/bright, judgement and insight intact, normal speech and appearance well-groomed.                Video-Visit Details    Type of service:  Video Visit     Originating Location (pt. Location): Other work  Distant Location (provider location):  On-site  Platform used for Video Visit: Sanket

## 2023-04-12 ENCOUNTER — LAB (OUTPATIENT)
Dept: LAB | Facility: CLINIC | Age: 53
End: 2023-04-12
Payer: COMMERCIAL

## 2023-04-12 DIAGNOSIS — R53.83 OTHER FATIGUE: ICD-10-CM

## 2023-04-12 DIAGNOSIS — E78.5 HYPERLIPIDEMIA LDL GOAL <100: ICD-10-CM

## 2023-04-12 DIAGNOSIS — Z12.5 SCREENING FOR PROSTATE CANCER: ICD-10-CM

## 2023-04-12 DIAGNOSIS — R73.9 HYPERGLYCEMIA: ICD-10-CM

## 2023-04-12 LAB
ALBUMIN SERPL BCG-MCNC: 4.9 G/DL (ref 3.5–5.2)
ALP SERPL-CCNC: 62 U/L (ref 40–129)
ALT SERPL W P-5'-P-CCNC: 38 U/L (ref 10–50)
ANION GAP SERPL CALCULATED.3IONS-SCNC: 11 MMOL/L (ref 7–15)
AST SERPL W P-5'-P-CCNC: 33 U/L (ref 10–50)
B BURGDOR IGG+IGM SER QL: 0.07
BASOPHILS # BLD AUTO: 0 10E3/UL (ref 0–0.2)
BASOPHILS NFR BLD AUTO: 1 %
BILIRUB SERPL-MCNC: 0.7 MG/DL
BUN SERPL-MCNC: 12.4 MG/DL (ref 6–20)
CALCIUM SERPL-MCNC: 9.7 MG/DL (ref 8.6–10)
CHLORIDE SERPL-SCNC: 101 MMOL/L (ref 98–107)
CHOLEST SERPL-MCNC: 236 MG/DL
CREAT SERPL-MCNC: 0.96 MG/DL (ref 0.67–1.17)
DEPRECATED HCO3 PLAS-SCNC: 26 MMOL/L (ref 22–29)
EOSINOPHIL # BLD AUTO: 0 10E3/UL (ref 0–0.7)
EOSINOPHIL NFR BLD AUTO: 1 %
ERYTHROCYTE [DISTWIDTH] IN BLOOD BY AUTOMATED COUNT: 12.4 % (ref 10–15)
GFR SERPL CREATININE-BSD FRML MDRD: >90 ML/MIN/1.73M2
GLUCOSE SERPL-MCNC: 108 MG/DL (ref 70–99)
HCT VFR BLD AUTO: 43.1 % (ref 40–53)
HDLC SERPL-MCNC: 39 MG/DL
HGB BLD-MCNC: 14.8 G/DL (ref 13.3–17.7)
LDLC SERPL CALC-MCNC: 148 MG/DL
LYMPHOCYTES # BLD AUTO: 1.9 10E3/UL (ref 0.8–5.3)
LYMPHOCYTES NFR BLD AUTO: 32 %
MCH RBC QN AUTO: 31.6 PG (ref 26.5–33)
MCHC RBC AUTO-ENTMCNC: 34.3 G/DL (ref 31.5–36.5)
MCV RBC AUTO: 92 FL (ref 78–100)
MONOCYTES # BLD AUTO: 0.6 10E3/UL (ref 0–1.3)
MONOCYTES NFR BLD AUTO: 10 %
NEUTROPHILS # BLD AUTO: 3.4 10E3/UL (ref 1.6–8.3)
NEUTROPHILS NFR BLD AUTO: 57 %
NONHDLC SERPL-MCNC: 197 MG/DL
PLATELET # BLD AUTO: 225 10E3/UL (ref 150–450)
POTASSIUM SERPL-SCNC: 4 MMOL/L (ref 3.4–5.3)
PROT SERPL-MCNC: 7.8 G/DL (ref 6.4–8.3)
PSA SERPL DL<=0.01 NG/ML-MCNC: 0.27 NG/ML (ref 0–3.5)
RBC # BLD AUTO: 4.69 10E6/UL (ref 4.4–5.9)
SODIUM SERPL-SCNC: 138 MMOL/L (ref 136–145)
TRIGL SERPL-MCNC: 246 MG/DL
TSH SERPL DL<=0.005 MIU/L-ACNC: 2.87 UIU/ML (ref 0.3–4.2)
WBC # BLD AUTO: 6 10E3/UL (ref 4–11)

## 2023-04-12 PROCEDURE — 36415 COLL VENOUS BLD VENIPUNCTURE: CPT

## 2023-04-12 PROCEDURE — 80050 GENERAL HEALTH PANEL: CPT

## 2023-04-12 PROCEDURE — 84403 ASSAY OF TOTAL TESTOSTERONE: CPT

## 2023-04-12 PROCEDURE — 86618 LYME DISEASE ANTIBODY: CPT

## 2023-04-12 PROCEDURE — 83036 HEMOGLOBIN GLYCOSYLATED A1C: CPT

## 2023-04-12 PROCEDURE — 80061 LIPID PANEL: CPT

## 2023-04-12 PROCEDURE — G0103 PSA SCREENING: HCPCS

## 2023-04-13 LAB — TESTOST SERPL-MCNC: 461 NG/DL (ref 240–950)

## 2023-04-14 ENCOUNTER — OFFICE VISIT (OUTPATIENT)
Dept: FAMILY MEDICINE | Facility: CLINIC | Age: 53
End: 2023-04-14
Payer: COMMERCIAL

## 2023-04-14 VITALS
DIASTOLIC BLOOD PRESSURE: 74 MMHG | OXYGEN SATURATION: 97 % | BODY MASS INDEX: 25.47 KG/M2 | WEIGHT: 188 LBS | TEMPERATURE: 97.8 F | RESPIRATION RATE: 16 BRPM | HEIGHT: 72 IN | HEART RATE: 75 BPM | SYSTOLIC BLOOD PRESSURE: 110 MMHG

## 2023-04-14 DIAGNOSIS — R73.9 HYPERGLYCEMIA: ICD-10-CM

## 2023-04-14 DIAGNOSIS — R53.83 OTHER FATIGUE: Primary | ICD-10-CM

## 2023-04-14 DIAGNOSIS — E78.5 HYPERLIPIDEMIA LDL GOAL <100: ICD-10-CM

## 2023-04-14 DIAGNOSIS — R06.83 SNORING: ICD-10-CM

## 2023-04-14 DIAGNOSIS — Z12.11 SPECIAL SCREENING FOR MALIGNANT NEOPLASMS, COLON: ICD-10-CM

## 2023-04-14 LAB — HBA1C MFR BLD: 5.2 % (ref 0–5.6)

## 2023-04-14 PROCEDURE — 99214 OFFICE O/P EST MOD 30 MIN: CPT | Performed by: FAMILY MEDICINE

## 2023-04-14 ASSESSMENT — ENCOUNTER SYMPTOMS: FATIGUE: 1

## 2023-04-14 ASSESSMENT — PAIN SCALES - GENERAL: PAINLEVEL: NO PAIN (0)

## 2023-04-14 NOTE — PATIENT INSTRUCTIONS
Component      Latest Ref [facility] 4/12/2023  8:48 AM   WBC      4.0 - 11.0 10e3/uL 6.0    RBC Count      4.40 - 5.90 10e6/uL 4.69    Hemoglobin      13.3 - 17.7 g/dL 14.8    Hematocrit      40.0 - 53.0 % 43.1    MCV      78 - 100 fL 92    MCH      26.5 - 33.0 pg 31.6    MCHC      31.5 - 36.5 g/dL 34.3    RDW      10.0 - 15.0 % 12.4    Platelet Count      150 - 450 10e3/uL 225    % Neutrophils      % 57    % Lymphocytes      % 32    % Monocytes      % 10    % Eosinophils      % 1    % Basophils      % 1    Absolute Neutrophils      1.6 - 8.3 10e3/uL 3.4    Absolute Lymphocytes      0.8 - 5.3 10e3/uL 1.9    Absolute Monocytes      0.0 - 1.3 10e3/uL 0.6    Absolute Eosinophils      0.0 - 0.7 10e3/uL 0.0    Absolute Basophils      0.0 - 0.2 10e3/uL 0.0    Sodium      136 - 145 mmol/L 138    Potassium      3.4 - 5.3 mmol/L 4.0    Chloride      98 - 107 mmol/L 101    Carbon Dioxide (CO2)      22 - 29 mmol/L 26    Anion Gap      7 - 15 mmol/L 11    Urea Nitrogen      6.0 - 20.0 mg/dL 12.4    Creatinine      0.67 - 1.17 mg/dL 0.96    Calcium      8.6 - 10.0 mg/dL 9.7    Glucose      70 - 99 mg/dL 108 (H)    Alkaline Phosphatase      40 - 129 U/L 62    AST      10 - 50 U/L 33    ALT      10 - 50 U/L 38    Protein Total      6.4 - 8.3 g/dL 7.8    Albumin      3.5 - 5.2 g/dL 4.9    Bilirubin Total      <=1.2 mg/dL 0.7    GFR Estimate      >60 mL/min/1.73m2 >90    Cholesterol      <200 mg/dL 236 (H)    Triglycerides      <150 mg/dL 246 (H)    HDL Cholesterol      >=40 mg/dL 39 (L)    LDL Cholesterol Calculated      <=100 mg/dL 148 (H)    Non HDL Cholesterol      <130 mg/dL 197 (H)    PSA Tumor Marker      0.00 - 3.50 ng/mL 0.27    TSH      0.30 - 4.20 uIU/mL 2.87    Testosterone Total      240 - 950 ng/dL 461    Lyme Disease Antibodies Serum      <0.90  0.07       Legend:  (H) High  (L) Low    Normal liver and kidney function.  I am checking a hemoglobin a1c to check for prediabetes.  You have normal thyroid function.  You have  normal testosterone level.  Your cholesterol is elevated with triglycerides and LDL choleserol.  Work on diet and exercise and recheck in 6 months.  No signs of prostate cancer.  Negative for lyme screening test.      Please get a sleep consult since your screening test is 4 for stopbang.    Just monitor your sensation in the left lower abdomen.      Thank you for choosing [facility].  You may be receiving an email and/or telephone survey request from [facility] Customer Experience regarding your visit today.  Please take a few minutes to respond to the survey to let us know how we are doing.      If you have questions or concerns, please contact us via [employer] or you can contact your care team at [phone] option [phone].    Our Clinic hours are:  Monday - Thursday 7am-6pm  Friday 7am-5pm    The [place] outpatient lab hours are:  Monday - Friday 7am-4:30pm    Appointments are required, call [phone]    If you have clinical questions after hours or would like to schedule an appointment,  call the clinic at [phone].

## 2023-04-14 NOTE — PROGRESS NOTES
Assessment & Plan     Other fatigue  Unclear etiology, labs were normal, has stopbang score of 4, intermediate risk of MONIQUE.  Will get consult  - Adult Sleep Eval & Management  Referral; Future    Snoring  As above  - Adult Sleep Eval & Management  Referral; Future    Hyperglycemia  Checked lab and no signs of prediabetes or diabetes  - Hemoglobin A1c; Future    Hyperlipidemia LDL goal <100  Handouts given and recheck lab in 6 months  - Lipid panel reflex to direct LDL Fasting; Future    Special screening for malignant neoplasms, colon    - Colonoscopy Screening  Referral; Future      Abdomen concern will monitor, no mass noted.  No umbilical hernia.       BMI:   Estimated body mass index is 25.5 kg/m  as calculated from the following:    Height as of this encounter: 1.829 m (6').    Weight as of this encounter: 85.3 kg (188 lb).       See Patient Instructions    Raoul Kaufman MD  [facility] is a 52 year old, presenting for the following health issues:  Mass in left abdomen (Here to discuss about a subcutaneous mass in the abdomen on the left side for over one year.  He did a Virtual Visit on 4-3-23 and was told to come in for evaluation.  See clinic note and plan from the visit that day.), Fatigue (He discussed about fatigue at the 4-3-23 Virtual Visit.  Lab levels were ordered and completed on 4-12-23./), and Imm/Inj (Declined updating tetanus injection today.  Declined Covid injection.  Mentioned about the shingles vaccine and checking with his insurance for coverage if wanting to get.)        4/14/2023     8:46 AM   Additional Questions   Roomed by Hawa Hernandez CMA     Chief Complaint   Patient presents with     Mass in left abdomen     Here to discuss about a subcutaneous mass in the abdomen on the left side for over one year.  He did a Virtual Visit on 4-3-23 and was told to come in for evaluation.  See clinic note and plan from  the visit that day.     Fatigue     He discussed about fatigue at the 4-3-23 Virtual Visit.  Lab levels were ordered and completed on 4-12-23.       Imm/Inj     Declined updating tetanus injection today.  Declined Covid injection.  Mentioned about the shingles vaccine and checking with his insurance for coverage if wanting to get.       Fatigue  Associated symptoms include fatigue.   History of Present Illness       Reason for visit:  Fatigue/ left abdominal area    He eats 2-3 servings of fruits and vegetables daily.He consumes 4 sweetened beverage(s) daily.He exercises with enough effort to increase his heart rate 10 to 19 minutes per day.  He exercises with enough effort to increase his heart rate 3 or less days per week.   He is taking medications regularly.               Review of Systems   Constitutional: Positive for fatigue.            Objective    /74 (BP Location: Left arm, Patient Position: Chair, Cuff Size: Adult Regular)   Pulse 75   Temp 97.8  F (36.6  C) (Tympanic)   Resp 16   Ht 1.829 m (6')   Wt 85.3 kg (188 lb)   SpO2 97%   BMI 25.50 kg/m    Body mass index is 25.5 kg/m .  Physical Exam   GENERAL APPEARANCE: alert, no distress and cooperative  HENT: ear canals and TM's normal  RESP: lungs clear to auscultation - no rales, rhonchi or wheezes  CV: regular rates and rhythm, normal S1 S2, no S3 or S4 and no murmur, click or rub  ABDOMEN: soft, nontender, without hepatosplenomegaly or masses and bowel sounds normal  MS: extremities normal- no gross deformities noted  SKIN: no suspicious lesions or rashes  NEURO: Normal strength and tone, mentation intact and speech normal  PSYCH: mentation appears normal and affect normal/bright

## 2023-06-13 ENCOUNTER — PATIENT OUTREACH (OUTPATIENT)
Dept: CARE COORDINATION | Facility: CLINIC | Age: 53
End: 2023-06-13
Payer: COMMERCIAL

## 2023-06-27 ENCOUNTER — PATIENT OUTREACH (OUTPATIENT)
Dept: CARE COORDINATION | Facility: CLINIC | Age: 53
End: 2023-06-27
Payer: COMMERCIAL

## 2023-09-10 ENCOUNTER — HEALTH MAINTENANCE LETTER (OUTPATIENT)
Age: 53
End: 2023-09-10

## 2023-12-09 ENCOUNTER — HOSPITAL ENCOUNTER (EMERGENCY)
Facility: CLINIC | Age: 53
Discharge: HOME | End: 2023-12-09
Attending: NURSE PRACTITIONER | Admitting: NURSE PRACTITIONER
Payer: COMMERCIAL

## 2023-12-09 VITALS
OXYGEN SATURATION: 96 % | TEMPERATURE: 97.6 F | HEART RATE: 99 BPM | DIASTOLIC BLOOD PRESSURE: 81 MMHG | RESPIRATION RATE: 16 BRPM | SYSTOLIC BLOOD PRESSURE: 127 MMHG

## 2023-12-09 DIAGNOSIS — B96.89 ACUTE BACTERIAL SINUSITIS: ICD-10-CM

## 2023-12-09 DIAGNOSIS — J01.90 ACUTE BACTERIAL SINUSITIS: ICD-10-CM

## 2023-12-09 PROCEDURE — G0463 HOSPITAL OUTPT CLINIC VISIT: HCPCS | Performed by: NURSE PRACTITIONER

## 2023-12-09 PROCEDURE — 99213 OFFICE O/P EST LOW 20 MIN: CPT | Performed by: NURSE PRACTITIONER

## 2023-12-09 RX ORDER — BENZONATATE 100 MG/1
100 CAPSULE ORAL 3 TIMES DAILY PRN
Qty: 21 CAPSULE | Refills: 0 | Status: SHIPPED | OUTPATIENT
Start: 2023-12-09 | End: 2024-09-26

## 2023-12-09 NOTE — ED TRIAGE NOTES
Pt reports cough worsening at nighttime and  headaches     Symptom onset 12/4/23    pt states 3 negative home covid tests

## 2023-12-09 NOTE — Clinical Note
Too Osman was seen and treated in our emergency department on 12/9/2023.  He may return to work on 12/11/2023.       If you have any questions or concerns, please don't hesitate to call.      Sona Hewitt, KIRA CNP

## 2023-12-09 NOTE — ED PROVIDER NOTES
ED Provider Note  [facility]      History     Chief Complaint   Patient presents with    Cough     HPI  Too Osman is a 53 year old male who reports being sick over the last week with rigorous chills, reports that he started feeling a little bit better and then over the last several days developed dark green thick nasal secretions and PND.  Reports that he tested 3 times last week for COVID and tested negative each time.  He was not tested for RSV or influenza.  Reports that the cough draining from his sinuses is so severe that it is keeping him up at night, has pain in his face surrounding maxillary sinuses bilateral.  No current fever.  Denies any current nausea, vomiting, diarrhea, abdominal pain, CVS tenderness.            Allergies:  Allergies   Allergen Reactions    Shellfish-Derived Products Other (See Comments)     Not sure, but thinks this may cause throat problems       Problem List:    Patient Active Problem List    Diagnosis Date Noted    Irritable bowel syndrome with diarrhea 05/11/2021     Priority: Medium    Anxiety 05/11/2021     Priority: Medium    Hypertriglyceridemia 08/22/2018     Priority: Medium        Past Medical History:    No past medical history on file.    Past Surgical History:    No past surgical history on file.    Family History:    Family History   Problem Relation Age of Onset    Anxiety Disorder Mother     Arthritis Father         gout    Other Cancer Father         ??melanoma    Dementia Maternal Grandfather     Eye Disorder Paternal Grandmother         mac degen    Hypertension Paternal Grandmother        Social History:  Marital Status:   [2]  Social History     Tobacco Use    Smoking status: Never    Smokeless tobacco: Current     Types: Chew    Tobacco comments:     tin lasts 4 days   Vaping Use    Vaping Use: Never used   Substance Use Topics    Alcohol use: Yes     Alcohol/week: 0.0 standard drinks of alcohol     Comment: 8-10 beers  on the weekends     Drug use: Never        Medications:    amoxicillin-clavulanate (AUGMENTIN) 875-125 MG tablet  benzonatate (TESSALON) 100 MG capsule  loperamide (IMODIUM A-D) 2 MG tablet          Review of Systems  A medically appropriate review of systems was performed with pertinent positives and negatives noted in the HPI, and all other systems negative.    Physical Exam   Patient Vitals for the past 24 hrs:   BP Temp Temp src Pulse Resp SpO2   12/09/23 1116 127/81 97.6  F (36.4  C) Tympanic 99 16 96 %          Physical Exam  General: alert and in no acute distress on arrival  Ears/Nose/Throat: ENT: Ear exam bilateral normal, normal TMs normal canals.  Nose: No erythema or edema patent nostrils bilateral.  Throat: Supple no adenopathy.   Head: atraumatic, normocephalic, Pain with palpation of maxillary sinuses  Lungs:  nonlabored, scattered rhonchi in upper lobes, middle and lower bases.  CV: Regular rate and rhythm, extremities warm and perfused  Skin: no rashes, no diaphoresis and skin color normal  Neuro: Patient awake, alert, speech is fluent  Psychiatric: affect/mood normal, appropriate historian      ED Course                 Procedures                    No results found for this or any previous visit (from the past 24 hour(s)).    MEDICATIONS GIVEN IN THE EMERGENCY DEPARTMENT:  Medications - No data to display             Assessments & Plan (with Medical Decision Making)  53 year old male who presents to the Urgent Care for evaluation of acute bacterial sinusitis       I have reviewed the nursing notes.    I have reviewed the findings, diagnosis, plan and need for follow up with the patient.        NEW PRESCRIPTIONS STARTED AT TODAY'S ER VISIT  New Prescriptions    AMOXICILLIN-CLAVULANATE (AUGMENTIN) 875-125 MG TABLET    Take 1 tablet by mouth 2 times daily for 7 days    BENZONATATE (TESSALON) 100 MG CAPSULE    Take 1 capsule (100 mg) by mouth 3 times daily as needed for cough       Final diagnoses:    Acute bacterial sinusitis       12/9/2023   [facility] EMERGENCY DEPT       Sona Hewitt, APRN CNP  12/09/23 1142

## 2023-12-26 ENCOUNTER — E-VISIT (OUTPATIENT)
Dept: URGENT CARE | Facility: CLINIC | Age: 53
End: 2023-12-26
Payer: COMMERCIAL

## 2023-12-26 ENCOUNTER — HOSPITAL ENCOUNTER (EMERGENCY)
Facility: CLINIC | Age: 53
Discharge: HOME | End: 2023-12-26
Attending: EMERGENCY MEDICINE | Admitting: EMERGENCY MEDICINE
Payer: COMMERCIAL

## 2023-12-26 VITALS
RESPIRATION RATE: 16 BRPM | HEIGHT: 72 IN | WEIGHT: 185 LBS | SYSTOLIC BLOOD PRESSURE: 113 MMHG | TEMPERATURE: 98.4 F | OXYGEN SATURATION: 97 % | BODY MASS INDEX: 25.06 KG/M2 | DIASTOLIC BLOOD PRESSURE: 75 MMHG | HEART RATE: 84 BPM

## 2023-12-26 DIAGNOSIS — R19.7 DIARRHEA, UNSPECIFIED TYPE: ICD-10-CM

## 2023-12-26 DIAGNOSIS — R19.7 DIARRHEA, UNSPECIFIED TYPE: Primary | ICD-10-CM

## 2023-12-26 LAB
HOLD SPECIMEN: NORMAL

## 2023-12-26 PROCEDURE — 99207 PR NON-BILLABLE SERV PER CHARTING: CPT | Performed by: EMERGENCY MEDICINE

## 2023-12-26 PROCEDURE — 250N000013 HC RX MED GY IP 250 OP 250 PS 637: Performed by: EMERGENCY MEDICINE

## 2023-12-26 PROCEDURE — 96360 HYDRATION IV INFUSION INIT: CPT | Performed by: EMERGENCY MEDICINE

## 2023-12-26 PROCEDURE — 258N000003 HC RX IP 258 OP 636: Performed by: EMERGENCY MEDICINE

## 2023-12-26 PROCEDURE — 99283 EMERGENCY DEPT VISIT LOW MDM: CPT | Mod: 25 | Performed by: EMERGENCY MEDICINE

## 2023-12-26 PROCEDURE — 99283 EMERGENCY DEPT VISIT LOW MDM: CPT | Performed by: EMERGENCY MEDICINE

## 2023-12-26 RX ORDER — LOPERAMIDE HCL 2 MG
4 CAPSULE ORAL ONCE
Status: COMPLETED | OUTPATIENT
Start: 2023-12-26 | End: 2023-12-26

## 2023-12-26 RX ORDER — BISMUTH SUBSALICYLATE 262 MG/1
524 TABLET, CHEWABLE ORAL ONCE
Status: COMPLETED | OUTPATIENT
Start: 2023-12-26 | End: 2023-12-26

## 2023-12-26 RX ADMIN — SODIUM CHLORIDE, POTASSIUM CHLORIDE, SODIUM LACTATE AND CALCIUM CHLORIDE 1000 ML: 600; 310; 30; 20 INJECTION, SOLUTION INTRAVENOUS at 09:43

## 2023-12-26 RX ADMIN — LOPERAMIDE HYDROCHLORIDE 4 MG: 2 CAPSULE ORAL at 09:50

## 2023-12-26 RX ADMIN — BISMUTH SUBSALICYLATE 524 MG: 262 TABLET, CHEWABLE ORAL at 09:51

## 2023-12-26 ASSESSMENT — ACTIVITIES OF DAILY LIVING (ADL)
ADLS_ACUITY_SCORE: 35
ADLS_ACUITY_SCORE: 35

## 2023-12-26 NOTE — ED PROVIDER NOTES
History     Chief Complaint   Patient presents with    Diarrhea     HPI  Too Osman is a 53 year old male who presents with diarrhea. No fevers. Has been going on for about 5 days now. No vomiting. No nausea. Diagnosed with covid at home.  Also was recently on amoxicillin but did not have diarrhea and concluded the course of that treatment prior to the onset of diarrhea.  He was seen here on the 9th of this month.  He was diagnosed with sinusitis and I reviewed that note.  This is what the amoxicillin was for.    Allergies:  Allergies   Allergen Reactions    Shellfish-Derived Products Other (See Comments)     Not sure, but thinks this may cause throat problems       Problem List:    Patient Active Problem List    Diagnosis Date Noted    Irritable bowel syndrome with diarrhea 05/11/2021     Priority: Medium    Anxiety 05/11/2021     Priority: Medium    Hypertriglyceridemia 08/22/2018     Priority: Medium        Past Medical History:    No past medical history on file.    Past Surgical History:    No past surgical history on file.    Family History:    Family History   Problem Relation Age of Onset    Anxiety Disorder Mother     Arthritis Father         gout    Other Cancer Father         ??melanoma    Dementia Maternal Grandfather     Eye Disorder Paternal Grandmother         mac degen    Hypertension Paternal Grandmother        Social History:  Marital Status:   [2]  Social History     Tobacco Use    Smoking status: Never    Smokeless tobacco: Current     Types: Chew    Tobacco comments:     tin lasts 4 days   Vaping Use    Vaping Use: Never used   Substance Use Topics    Alcohol use: Yes     Alcohol/week: 0.0 standard drinks of alcohol     Comment: 8-10 beers on the weekends     Drug use: Never        Medications:    benzonatate (TESSALON) 100 MG capsule  loperamide (IMODIUM A-D) 2 MG tablet          Review of Systems    Physical Exam   BP: 113/75  Pulse: 93  Temp: 98.4  F (36.9  C)  Resp:  16  Height: 182.9 cm (6')  Weight: 83.9 kg (185 lb)  SpO2: 96 %      Physical Exam  Constitutional:       General: He is not in acute distress.     Appearance: He is not ill-appearing or toxic-appearing.   HENT:      Head: Normocephalic and atraumatic.      Right Ear: External ear normal.      Left Ear: External ear normal.   Cardiovascular:      Rate and Rhythm: Normal rate.   Pulmonary:      Effort: No respiratory distress.      Breath sounds: No wheezing.   Abdominal:      General: There is no distension.      Palpations: There is no mass.      Tenderness: There is no abdominal tenderness. There is no right CVA tenderness or left CVA tenderness.      Hernia: No hernia is present.   Musculoskeletal:         General: No swelling.   Skin:     Capillary Refill: Capillary refill takes less than 2 seconds.      Coloration: Skin is not jaundiced.   Neurological:      General: No focal deficit present.      Cranial Nerves: No cranial nerve deficit.         ED Course              ED Course as of 12/26/23 1059   Tue Dec 26, 2023   1040 Patient says he is feeling better at this time.  I gave him a work note.  I recommended probiotics, Imodium, Pepto-Bismol.   1058 Patient feels safe with discharge.  He looks well.  I think this is probably due to the COVID, I confirmed he has not traveled recently.  There may be an amoxicillin component and I recommended probiotics.  He clarified that the diarrhea does not have any particular malodor and I my suspicion for C. difficile is fairly low.  Going to defer stool studies as he has not had any diarrhea here and he would prefer to go home and rest.     Procedures              Results for orders placed or performed during the hospital encounter of 12/26/23 (from the past 24 hour(s))   [place] Draw    Narrative    The following orders were created for panel order [place] Draw.  Procedure                               Abnormality         Status                     ---------                                -----------         ------                     Extra Blue Top Tube[[id number]]                              Final result               Extra Red Top Tube[[id number]]                               Final result               Extra Green Top (Lithium...[[id number]]                      Final result               Extra Purple Top Tube[[id number]]                            Final result                 Please view results for these tests on the individual orders.   Extra Blue Top Tube   Result Value Ref Range    Hold Specimen JIC    Extra Red Top Tube   Result Value Ref Range    Hold Specimen JIC    Extra Green Top (Lithium Heparin) Tube   Result Value Ref Range    Hold Specimen JIC    Extra Purple Top Tube   Result Value Ref Range    Hold Specimen JIC        Medications   loperamide (IMODIUM) capsule 4 mg (4 mg Oral $Given 12/26/23 0950)   bismuth subsalicylate (PEPTO BISMOL) chewable tablet 524 mg (524 mg Oral $Given 12/26/23 0951)   lactated ringers BOLUS 1,000 mL (1,000 mLs Intravenous $New Bag 12/26/23 0943)       Assessments & Plan (with Medical Decision Making)     Likely due to covid. May have post-antibiotic component. Doubt SBI. Doubt c. Diff. Will give imodium, pepto, fluids. Rec'd probiotics.    I have reviewed the nursing notes.    I have reviewed the findings, diagnosis, plan and need for follow up with the patient.      Medical Decision Making  The patient's presentation was of low complexity (an acute and uncomplicated illness or injury).    The patient's evaluation involved:  history and exam without other MDM data elements    The patient's management necessitated moderate risk (prescription drug management including medications given in the ED).        New Prescriptions    No medications on file       Final diagnoses:   Diarrhea, unspecified type       12/26/2023   [facility] EMERGENCY DEPT       Luis Olea MD  12/26/23 9425

## 2023-12-26 NOTE — PATIENT INSTRUCTIONS
Dear Too Osman,    We are sorry you are not feeling well. Based on the responses you provided, you may be experiencing a serious health condition that needs immediate in-person attention. It is recommended that you be seen in the emergency room in order to better evaluate your symptoms. Please click here to find the nearest Emergency Room.     Miguel Bennett MD  Diarrhea: Care Instructions  Overview     Diarrhea is loose, watery stools (bowel movements). The exact cause is often hard to find. Sometimes diarrhea is your body's way of getting rid of what caused an upset stomach. Viruses, food poisoning, and many medicines can cause diarrhea. Some people get diarrhea in response to emotional stress, anxiety, or certain foods.  Almost everyone has diarrhea now and then. It usually isn't serious, and your stools will return to normal soon. The important thing to do is replace the fluids you have lost, so you can prevent dehydration.  The doctor has checked you carefully, but problems can develop later. If you notice any problems or new symptoms, get medical treatment right away.  Follow-up care is a key part of your treatment and safety. Be sure to make and go to all appointments, and call your doctor if you are having problems. It's also a good idea to know your test results and keep a list of the medicines you take.  How can you care for yourself at home?    Watch for signs of dehydration, which means your body has lost too much water. Dehydration is a serious condition and should be treated right away. Signs of dehydration are:  ? Increasing thirst and dry eyes and mouth.  ? Feeling faint or lightheaded.  ? A smaller amount of urine than normal.    To prevent dehydration, drink plenty of fluids. Choose water and other clear liquids until you feel better. If you have kidney, heart, or liver disease and have to limit fluids, talk with your doctor before you increase the amount of fluids you drink.    When  "you feel like eating, start with small amounts of food.    The doctor may recommend that you take over-the-counter medicine, such as loperamide (Imodium). Read and follow all instructions on the label. Do not use this medicine if you have bloody diarrhea, a high fever, or other signs of serious illness. Call your doctor if you think you are having a problem with your medicine.  When should you call for help?   Call 911 anytime you think you may need emergency care. For example, call if:      You passed out (lost consciousness).       Your stools are maroon or very bloody.   Call your doctor now or seek immediate medical care if:      You are dizzy or lightheaded, or you feel like you may faint.       Your stools are black and look like tar, or they have streaks of blood.       You have new or worse belly pain.       You have symptoms of dehydration, such as:  ? Dry eyes and a dry mouth.  ? Passing only a little urine.  ? Cannot keep fluids down.       You have a new or higher fever.   Watch closely for changes in your health, and be sure to contact your doctor if:      Your diarrhea is getting worse.       You see pus in the diarrhea.       You are not getting better after 2 days (48 hours).   Where can you learn more?  Go to https://www.[employer].net/patiented  Enter W335 in the search box to learn more about \"Diarrhea: Care Instructions.\"  Current as of: March 21, 2023               Content Version: 13.8    9769-5079 [employer].   Care instructions adapted under license by your healthcare professional. If you have questions about a medical condition or this instruction, always ask your healthcare professional. [employer] disclaims any warranty or liability for your use of this information.      "

## 2023-12-26 NOTE — LETTER
December 26, 2023      To Whom It May Concern:      Too Osman was seen in our Emergency Department today, 12/26/23.  I expect his condition to improve over the next three days.      He can return to work on 12/29/23.    Thank you for your understanding.      Sincerely,        Luis Olea MD

## 2023-12-26 NOTE — DISCHARGE INSTRUCTIONS
Sorry that you are dealing with this.    I would recommend using Imodium 4 mg every 6 hours as needed.     I would also drink Pepto Bismol every 6 hours as well.     I would keep taking the probiotic and the yogurt.    Starchy foods like rice and potatoes may also help.    Stay hydrated.     This will probably get better with time.

## 2023-12-31 ENCOUNTER — HOSPITAL ENCOUNTER (EMERGENCY)
Facility: CLINIC | Age: 53
Discharge: HOME | End: 2023-12-31
Attending: EMERGENCY MEDICINE | Admitting: EMERGENCY MEDICINE
Payer: COMMERCIAL

## 2023-12-31 VITALS
RESPIRATION RATE: 20 BRPM | HEART RATE: 90 BPM | WEIGHT: 180 LBS | BODY MASS INDEX: 24.38 KG/M2 | HEIGHT: 72 IN | SYSTOLIC BLOOD PRESSURE: 132 MMHG | TEMPERATURE: 98.7 F | OXYGEN SATURATION: 98 % | DIASTOLIC BLOOD PRESSURE: 84 MMHG

## 2023-12-31 DIAGNOSIS — R19.7 DIARRHEA, UNSPECIFIED TYPE: ICD-10-CM

## 2023-12-31 LAB
ALBUMIN SERPL BCG-MCNC: 4.1 G/DL (ref 3.5–5.2)
ALP SERPL-CCNC: 71 U/L (ref 40–150)
ALT SERPL W P-5'-P-CCNC: 38 U/L (ref 0–70)
ANION GAP SERPL CALCULATED.3IONS-SCNC: 9 MMOL/L (ref 7–15)
AST SERPL W P-5'-P-CCNC: 27 U/L (ref 0–45)
BASOPHILS # BLD AUTO: 0 10E3/UL (ref 0–0.2)
BASOPHILS NFR BLD AUTO: 0 %
BILIRUB SERPL-MCNC: 0.4 MG/DL
BUN SERPL-MCNC: 7.6 MG/DL (ref 6–20)
CALCIUM SERPL-MCNC: 8.7 MG/DL (ref 8.6–10)
CHLORIDE SERPL-SCNC: 100 MMOL/L (ref 98–107)
CREAT SERPL-MCNC: 0.83 MG/DL (ref 0.67–1.17)
DEPRECATED HCO3 PLAS-SCNC: 27 MMOL/L (ref 22–29)
EGFRCR SERPLBLD CKD-EPI 2021: >90 ML/MIN/1.73M2
EOSINOPHIL # BLD AUTO: 0.1 10E3/UL (ref 0–0.7)
EOSINOPHIL NFR BLD AUTO: 1 %
ERYTHROCYTE [DISTWIDTH] IN BLOOD BY AUTOMATED COUNT: 11.9 % (ref 10–15)
GLUCOSE SERPL-MCNC: 96 MG/DL (ref 70–99)
HCT VFR BLD AUTO: 41.9 % (ref 40–53)
HGB BLD-MCNC: 14.3 G/DL (ref 13.3–17.7)
HOLD SPECIMEN: NORMAL
HOLD SPECIMEN: NORMAL
IMM GRANULOCYTES # BLD: 0.1 10E3/UL
IMM GRANULOCYTES NFR BLD: 0 %
LYMPHOCYTES # BLD AUTO: 2.3 10E3/UL (ref 0.8–5.3)
LYMPHOCYTES NFR BLD AUTO: 17 %
MCH RBC QN AUTO: 31.3 PG (ref 26.5–33)
MCHC RBC AUTO-ENTMCNC: 34.1 G/DL (ref 31.5–36.5)
MCV RBC AUTO: 92 FL (ref 78–100)
MONOCYTES # BLD AUTO: 1.1 10E3/UL (ref 0–1.3)
MONOCYTES NFR BLD AUTO: 8 %
NEUTROPHILS # BLD AUTO: 9.6 10E3/UL (ref 1.6–8.3)
NEUTROPHILS NFR BLD AUTO: 74 %
NRBC # BLD AUTO: 0 10E3/UL
NRBC BLD AUTO-RTO: 0 /100
PLATELET # BLD AUTO: 225 10E3/UL (ref 150–450)
POTASSIUM SERPL-SCNC: 4.4 MMOL/L (ref 3.4–5.3)
PROT SERPL-MCNC: 7 G/DL (ref 6.4–8.3)
RBC # BLD AUTO: 4.57 10E6/UL (ref 4.4–5.9)
SODIUM SERPL-SCNC: 136 MMOL/L (ref 135–145)
WBC # BLD AUTO: 13.2 10E3/UL (ref 4–11)

## 2023-12-31 PROCEDURE — 87493 C DIFF AMPLIFIED PROBE: CPT | Performed by: EMERGENCY MEDICINE

## 2023-12-31 PROCEDURE — 36415 COLL VENOUS BLD VENIPUNCTURE: CPT | Performed by: EMERGENCY MEDICINE

## 2023-12-31 PROCEDURE — 96360 HYDRATION IV INFUSION INIT: CPT | Performed by: EMERGENCY MEDICINE

## 2023-12-31 PROCEDURE — 99284 EMERGENCY DEPT VISIT MOD MDM: CPT | Performed by: EMERGENCY MEDICINE

## 2023-12-31 PROCEDURE — 99283 EMERGENCY DEPT VISIT LOW MDM: CPT | Mod: 25 | Performed by: EMERGENCY MEDICINE

## 2023-12-31 PROCEDURE — 85025 COMPLETE CBC W/AUTO DIFF WBC: CPT | Performed by: EMERGENCY MEDICINE

## 2023-12-31 PROCEDURE — 87507 IADNA-DNA/RNA PROBE TQ 12-25: CPT | Performed by: EMERGENCY MEDICINE

## 2023-12-31 PROCEDURE — 87324 CLOSTRIDIUM AG IA: CPT | Mod: XU | Performed by: EMERGENCY MEDICINE

## 2023-12-31 PROCEDURE — 80053 COMPREHEN METABOLIC PANEL: CPT | Performed by: EMERGENCY MEDICINE

## 2023-12-31 PROCEDURE — 258N000003 HC RX IP 258 OP 636: Performed by: EMERGENCY MEDICINE

## 2023-12-31 RX ORDER — LOPERAMIDE HCL 2 MG
4 CAPSULE ORAL ONCE
Status: COMPLETED | OUTPATIENT
Start: 2023-12-31 | End: 2023-12-31

## 2023-12-31 RX ADMIN — SODIUM CHLORIDE, POTASSIUM CHLORIDE, SODIUM LACTATE AND CALCIUM CHLORIDE 1000 ML: 600; 310; 30; 20 INJECTION, SOLUTION INTRAVENOUS at 21:43

## 2023-12-31 ASSESSMENT — ENCOUNTER SYMPTOMS
SHORTNESS OF BREATH: 0
APPETITE CHANGE: 1
DIARRHEA: 1
ABDOMINAL PAIN: 1
BACK PAIN: 0
VOMITING: 0
MYALGIAS: 0
FATIGUE: 0
HEADACHES: 0
NAUSEA: 0
FEVER: 0
COUGH: 0
SORE THROAT: 0
BLOOD IN STOOL: 0
CHEST TIGHTNESS: 0
LIGHT-HEADEDNESS: 0

## 2023-12-31 ASSESSMENT — ACTIVITIES OF DAILY LIVING (ADL)
ADLS_ACUITY_SCORE: 33
ADLS_ACUITY_SCORE: 35

## 2024-01-01 ENCOUNTER — TELEPHONE (OUTPATIENT)
Dept: EMERGENCY MEDICINE | Facility: CLINIC | Age: 54
End: 2024-01-01
Payer: COMMERCIAL

## 2024-01-01 LAB
ADV 40+41 DNA STL QL NAA+NON-PROBE: NEGATIVE
ASTRO TYP 1-8 RNA STL QL NAA+NON-PROBE: NEGATIVE
C CAYETANENSIS DNA STL QL NAA+NON-PROBE: NEGATIVE
C DIFF GDH STL QL IA: POSITIVE
C DIFF TOX A+B STL QL IA: POSITIVE
C DIFF TOX B STL QL: POSITIVE
CAMPYLOBACTER DNA SPEC NAA+PROBE: NEGATIVE
CRYPTOSP DNA STL QL NAA+NON-PROBE: NEGATIVE
E COLI O157 DNA STL QL NAA+NON-PROBE: NORMAL
E HISTOLYT DNA STL QL NAA+NON-PROBE: NEGATIVE
EAEC ASTA GENE ISLT QL NAA+PROBE: NEGATIVE
EC STX1+STX2 GENES STL QL NAA+NON-PROBE: NEGATIVE
EPEC EAE GENE STL QL NAA+NON-PROBE: NEGATIVE
ETEC LTA+ST1A+ST1B TOX ST NAA+NON-PROBE: NEGATIVE
G LAMBLIA DNA STL QL NAA+NON-PROBE: NEGATIVE
NOROVIRUS GI+II RNA STL QL NAA+NON-PROBE: NEGATIVE
P SHIGELLOIDES DNA STL QL NAA+NON-PROBE: NEGATIVE
RVA RNA STL QL NAA+NON-PROBE: NEGATIVE
SALMONELLA SP RPOD STL QL NAA+PROBE: NEGATIVE
SAPO I+II+IV+V RNA STL QL NAA+NON-PROBE: NEGATIVE
SHIGELLA SP+EIEC IPAH ST NAA+NON-PROBE: NEGATIVE
V CHOLERAE DNA SPEC QL NAA+PROBE: NEGATIVE
VIBRIO DNA SPEC NAA+PROBE: NEGATIVE
Y ENTEROCOL DNA STL QL NAA+PROBE: NEGATIVE

## 2024-01-01 RX ORDER — VANCOMYCIN HYDROCHLORIDE 125 MG/1
125 CAPSULE ORAL 4 TIMES DAILY
Qty: 40 CAPSULE | Refills: 0 | Status: SHIPPED | OUTPATIENT
Start: 2024-01-01 | End: 2024-01-11

## 2024-01-01 NOTE — TELEPHONE ENCOUNTER
[facility] Emergency Department/Urgent Care Lab result notification  [Note:  ED Lab Results RN will reference the [facility] Emergency Dept visit note prior to contacting patient AND/OR prior to consulting Emergency Dept Provider.  Highlights of Emergency Dept visit in information summary at the bottom of this telephone note]    1. Reason for call  Notify of lab results  Assess patient symptoms [if necessary]  Review ED Providers recommendations/discharge instructions (if necessary)  Advise per [facility] ED lab result protocol    2. Lab Result (including Rx patient on, if applicable).  If culture, copy of lab report at bottom.  Component      Latest Ref Rng 12/31/2023  10:53 PM   C. difficile GDH Antigen      Negative  Positive !    C. Difficile Toxin      Negative  Positive !    C Difficile Toxin B by PCR      Negative  Positive !       Legend:  ! Abnormal    3. RN Assessment (Patient's current Symptoms):  Time of call: 9:45A  Assessment: Sx's persisting    4. RN Recommendations/Instructions per [facility] ED lab result protocol  [facility] ED lab result protocol used: C diff  Chase was notified of lab result and treatment recommendations  Start or switch to Rx for Vancomycin (VANCOCIN HCL) 125 MG capsule, 1 capsule (125 mg) by mouth 4 times daily for 10 days sent to [Pharmacy - [facility]].    RN reviewed information about C diff.  Sent information vis [employer]    5. Please Contact your PCP clinic or return to the Emergency department if your:  Symptoms do not improve after 3 days on antibiotic.  Symptoms do not resolve after completing antibiotic.  Symptoms worsen or other concerning symptoms.    Information summary from Emergency Dept/Urgent Care visit on 12/31/23  Symptoms reported at ED/UC visit (Chief complaint, HPI)     Chief Complaint   Patient presents with    Diarrhea      HPI  Too Osman is a 53 year old male with a history of irritable bowel syndrome presented  for evaluation of about 10 days of diarrhea.  Is not having abdominal cramping and frequent watery stools.  Patient reports initially having stools every 1-2 hours.  Stool frequency has decreased recently due to taking Imodium and now is having stool every 3-5 hours.  Continues to have pronounced watery stool with crampy belly pains.  No fevers.  No blood in the stool.  No nausea or vomiting.  He had a decreased appetite but still able to keep down fluids.  He reports normal urination.  No known sick contact.  No recent travel or known untreated water exposure.      Significant Medical hx, if applicable (i.e. CKD, diabetes) Reviewed   Allergies Allergies   Allergen Reactions    Shellfish-Derived Products Other (See Comments)     Not sure, but thinks this may cause throat problems      Weight, if applicable Wt Readings from Last 2 Encounters:   12/31/23 81.6 kg (180 lb)   12/26/23 83.9 kg (185 lb)      Coumadin/Warfarin [Yes /No] No   Creatinine Level (mg/dl) Creatinine   Date Value Ref Range Status   12/31/2023 0.83 0.67 - 1.17 mg/dL Final   04/23/2021 0.88 0.66 - 1.25 mg/dL Final      Creatinine clearance (ml/min), if applicable Serum creatinine: 0.83 mg/dL 12/31/23 2144  Estimated creatinine clearance: 116.1 mL/min   ED/UC Provider Impression and Plan (applicable information) 53-year-old presenting for evaluation of diarrhea.  Has been having diarrhea for about 10 days.  No fevers and appears well-hydrated.  Abdomen is soft with hyperactive bowel sounds but no focal tenderness.  Screening labs reassuring with no electrolyte abnormalities.  Mild leukocytosis only.  Obtain stool samples to see if any alterations in treatment are needed.  Counseled patient on continued symptomatic treatment for his diarrhea with expected clinical improvement over the next several days.    ED/UC diagnosis Diarrhea, unspecified type    ED/UC Provider Cook, MD Trev Aleman RN  [facility]  [facility]  Emergency Dept Lab Result RN  Ph# [phone]

## 2024-01-01 NOTE — ED NOTES
Pt reports diarrhea x4 today. Reports taking 2 tabs of immodium as told by MD. Reports intermittent cramping. Stool is mucous-like in nature. Has also done activated charcoal and Peptobismol.

## 2024-01-01 NOTE — RESULT ENCOUNTER NOTE
Final C.difficile Toxin B PCR with reflex to C.difficile Antigen and Toxins A/B EIA is POSITIVE.  (PCR and Toxin are all POSITIVE)  [facility] Emergency Dept discharge antibiotic: None  Recommendations in treatment per [facility] ED Lab result Clostridium difficile protocol.

## 2024-01-01 NOTE — ED TRIAGE NOTES
PATIENT HERE ON 12/26 FOR SIMILAR SYMPTOMS but states diarrhea is worse    Triage Assessment (Adult)       Row Name 12/31/23 1544          Triage Assessment    Airway WDL WDL        Respiratory WDL    Respiratory WDL WDL        Skin Circulation/Temperature WDL    Skin Circulation/Temperature WDL WDL        Cardiac WDL    Cardiac WDL WDL     Cardiac Rhythm NSR        Peripheral/Neurovascular WDL    Peripheral Neurovascular WDL WDL        Cognitive/Neuro/Behavioral WDL    Cognitive/Neuro/Behavioral WDL WDL

## 2024-01-01 NOTE — ED PROVIDER NOTES
History     Chief Complaint   Patient presents with    Diarrhea     HPI  Too Osman is a 53 year old male with a history of irritable bowel syndrome presented for evaluation of about 10 days of diarrhea.  Is not having abdominal cramping and frequent watery stools.  Patient reports initially having stools every 1-2 hours.  Stool frequency has decreased recently due to taking Imodium and now is having stool every 3-5 hours.  Continues to have pronounced watery stool with crampy belly pains.  No fevers.  No blood in the stool.  No nausea or vomiting.  He had a decreased appetite but still able to keep down fluids.  He reports normal urination.  No known sick contact.  No recent travel or known untreated water exposure.    Allergies:  Allergies   Allergen Reactions    Shellfish-Derived Products Other (See Comments)     Not sure, but thinks this may cause throat problems       Problem List:    Patient Active Problem List    Diagnosis Date Noted    Irritable bowel syndrome with diarrhea 05/11/2021     Priority: Medium    Anxiety 05/11/2021     Priority: Medium    Hypertriglyceridemia 08/22/2018     Priority: Medium        Past Medical History:    No past medical history on file.    Past Surgical History:    No past surgical history on file.    Family History:    Family History   Problem Relation Age of Onset    Anxiety Disorder Mother     Arthritis Father         gout    Other Cancer Father         ??melanoma    Dementia Maternal Grandfather     Eye Disorder Paternal Grandmother         mac degen    Hypertension Paternal Grandmother        Social History:  Marital Status:   [2]  Social History     Tobacco Use    Smoking status: Never    Smokeless tobacco: Current     Types: Chew    Tobacco comments:     tin lasts 4 days   Vaping Use    Vaping Use: Never used   Substance Use Topics    Alcohol use: Yes     Alcohol/week: 0.0 standard drinks of alcohol     Comment: 8-10 beers on the weekends     Drug use:  Never        Medications:    benzonatate (TESSALON) 100 MG capsule  loperamide (IMODIUM A-D) 2 MG tablet          Review of Systems   Constitutional:  Positive for appetite change (Decrease). Negative for fatigue and fever.   HENT:  Negative for congestion and sore throat.    Respiratory:  Negative for cough, chest tightness and shortness of breath.    Cardiovascular:  Negative for chest pain.   Gastrointestinal:  Positive for abdominal pain (Cramping pain intermittently) and diarrhea (Mucousy, dark after taking Pepto-Bismol). Negative for blood in stool, nausea and vomiting.   Genitourinary:  Negative for decreased urine volume.   Musculoskeletal:  Negative for back pain and myalgias.   Skin:  Negative for rash.   Neurological:  Negative for light-headedness and headaches.   All other systems reviewed and are negative.      Physical Exam   BP: 113/73  Pulse: 95  Temp: 98.7  F (37.1  C)  Resp: 20  Height: 182.9 cm (6')  Weight: 81.6 kg (180 lb)  SpO2: 97 %      Physical Exam  Vitals and nursing note reviewed.   Constitutional:       Appearance: Normal appearance. He is not ill-appearing or diaphoretic.   HENT:      Head: Atraumatic.      Nose: Nose normal.      Mouth/Throat:      Comments: Mildly dry oral mucous membranes  Eyes:      Conjunctiva/sclera: Conjunctivae normal.   Cardiovascular:      Rate and Rhythm: Normal rate.      Pulses: Normal pulses.   Pulmonary:      Effort: Pulmonary effort is normal.   Abdominal:      Palpations: Abdomen is soft.      Tenderness: There is no abdominal tenderness. There is no guarding.      Comments: Hyperactive bowel sound   Skin:     General: Skin is warm and dry.      Capillary Refill: Capillary refill takes less than 2 seconds.   Neurological:      Mental Status: He is alert and oriented to person, place, and time.   Psychiatric:         Mood and Affect: Mood normal.         ED Course                 Procedures           Results for orders placed or performed during the  hospital encounter of 12/31/23 (from the past 24 hour(s))   CBC with platelets differential    Narrative    The following orders were created for panel order CBC with platelets differential.  Procedure                               Abnormality         Status                     ---------                               -----------         ------                     CBC with platelets and d...[[id number]]  Abnormal            Final result                 Please view results for these tests on the individual orders.   Comprehensive metabolic panel   Result Value Ref Range    Sodium 136 135 - 145 mmol/L    Potassium 4.4 3.4 - 5.3 mmol/L    Carbon Dioxide (CO2) 27 22 - 29 mmol/L    Anion Gap 9 7 - 15 mmol/L    Urea Nitrogen 7.6 6.0 - 20.0 mg/dL    Creatinine 0.83 0.67 - 1.17 mg/dL    GFR Estimate >90 >60 mL/min/1.73m2    Calcium 8.7 8.6 - 10.0 mg/dL    Chloride 100 98 - 107 mmol/L    Glucose 96 70 - 99 mg/dL    Alkaline Phosphatase 71 40 - 150 U/L    AST 27 0 - 45 U/L    ALT 38 0 - 70 U/L    Protein Total 7.0 6.4 - 8.3 g/dL    Albumin 4.1 3.5 - 5.2 g/dL    Bilirubin Total 0.4 <=1.2 mg/dL   CBC with platelets and differential   Result Value Ref Range    WBC Count 13.2 (H) 4.0 - 11.0 10e3/uL    RBC Count 4.57 4.40 - 5.90 10e6/uL    Hemoglobin 14.3 13.3 - 17.7 g/dL    Hematocrit 41.9 40.0 - 53.0 %    MCV 92 78 - 100 fL    MCH 31.3 26.5 - 33.0 pg    MCHC 34.1 31.5 - 36.5 g/dL    RDW 11.9 10.0 - 15.0 %    Platelet Count 225 150 - 450 10e3/uL    % Neutrophils 74 %    % Lymphocytes 17 %    % Monocytes 8 %    % Eosinophils 1 %    % Basophils 0 %    % Immature Granulocytes 0 %    NRBCs per 100 WBC 0 <1 /100    Absolute Neutrophils 9.6 (H) 1.6 - 8.3 10e3/uL    Absolute Lymphocytes 2.3 0.8 - 5.3 10e3/uL    Absolute Monocytes 1.1 0.0 - 1.3 10e3/uL    Absolute Eosinophils 0.1 0.0 - 0.7 10e3/uL    Absolute Basophils 0.0 0.0 - 0.2 10e3/uL    Absolute Immature Granulocytes 0.1 <=0.4 10e3/uL    Absolute NRBCs 0.0 10e3/uL   [place] Draw     Narrative    The following orders were created for panel order [place] Draw.  Procedure                               Abnormality         Status                     ---------                               -----------         ------                     Extra Blue Top Tube[[id number]]                              In process                 Extra Red Top Tube[[id number]]                               In process                   Please view results for these tests on the individual orders.       Medications   loperamide (IMODIUM) capsule 4 mg (4 mg Oral Not Given 12/31/23 2054)   lactated ringers BOLUS 1,000 mL (1,000 mLs Intravenous $New Bag 12/31/23 2143)       10:49 PM: Labs reviewed. Normal electrolytes. Patient re-assessed.  Patient resting company.  Advised of reassuring labs    Assessments & Plan (with Medical Decision Making)  53-year-old presenting for evaluation of diarrhea.  Has been having diarrhea for about 10 days.  No fevers and appears well-hydrated.  Abdomen is soft with hyperactive bowel sounds but no focal tenderness.  Screening labs reassuring with no electrolyte abnormalities.  Mild leukocytosis only.  Obtain stool samples to see if any alterations in treatment are needed.  Counseled patient on continued symptomatic treatment for his diarrhea with expected clinical improvement over the next several days.     I have reviewed the nursing notes.    I have reviewed the findings, diagnosis, plan and need for follow up with the patient.        New Prescriptions    No medications on file       Final diagnoses:   Diarrhea, unspecified type       12/31/2023   [facility] EMERGENCY DEPT       Cook, Raffaele Hewitt MD  12/31/23 5336

## 2024-01-01 NOTE — RESULT ENCOUNTER NOTE
Final Enteric Bacteria and Virus Panel by [facility]ALVA Stool is NEGATIVE for all tested organisms (bacteria/virus).  No treatment or change in treatment per [facility] Lab Result Enteric Bacteria and Virus Panel protocol.

## 2024-01-01 NOTE — RESULT ENCOUNTER NOTE
Final C.difficile Toxin B PCR with reflex to C.difficile Antigen and Toxins A/B EIA shows the C difficile Toxin B PCR is POSITIVE.  Await reflex result for C.difficile Antigen and Toxins A/B EIA

## 2024-01-01 NOTE — RESULT ENCOUNTER NOTE
Chase was notified of lab result and treatment recommendations  Start or switch to Rx for Vancomycin (VANCOCIN HCL) 125 MG capsule, 1 capsule (125 mg) by mouth 4 times daily for 10 days sent to [Pharmacy - [facility]].

## 2024-01-19 ENCOUNTER — MYC MEDICAL ADVICE (OUTPATIENT)
Dept: FAMILY MEDICINE | Facility: CLINIC | Age: 54
End: 2024-01-19
Payer: COMMERCIAL

## 2024-01-19 ENCOUNTER — NURSE TRIAGE (OUTPATIENT)
Dept: NURSING | Facility: CLINIC | Age: 54
End: 2024-01-19

## 2024-01-19 ENCOUNTER — E-VISIT (OUTPATIENT)
Dept: URGENT CARE | Facility: CLINIC | Age: 54
End: 2024-01-19
Payer: COMMERCIAL

## 2024-01-19 DIAGNOSIS — R19.7 DIARRHEA, UNSPECIFIED TYPE: Primary | ICD-10-CM

## 2024-01-19 PROCEDURE — 99207 PR NON-BILLABLE SERV PER CHARTING: CPT | Performed by: NURSE PRACTITIONER

## 2024-01-20 ENCOUNTER — HOSPITAL ENCOUNTER (EMERGENCY)
Facility: CLINIC | Age: 54
Discharge: HOME | End: 2024-01-20
Attending: PHYSICIAN ASSISTANT | Admitting: PHYSICIAN ASSISTANT
Payer: COMMERCIAL

## 2024-01-20 VITALS
RESPIRATION RATE: 22 BRPM | HEART RATE: 85 BPM | OXYGEN SATURATION: 97 % | DIASTOLIC BLOOD PRESSURE: 70 MMHG | TEMPERATURE: 98.7 F | SYSTOLIC BLOOD PRESSURE: 107 MMHG

## 2024-01-20 DIAGNOSIS — Z86.19 HX OF CLOSTRIDIUM DIFFICILE INFECTION: ICD-10-CM

## 2024-01-20 DIAGNOSIS — R19.7 DIARRHEA: ICD-10-CM

## 2024-01-20 PROCEDURE — 87324 CLOSTRIDIUM AG IA: CPT | Performed by: PHYSICIAN ASSISTANT

## 2024-01-20 PROCEDURE — G0463 HOSPITAL OUTPT CLINIC VISIT: HCPCS

## 2024-01-20 PROCEDURE — 87507 IADNA-DNA/RNA PROBE TQ 12-25: CPT | Performed by: PHYSICIAN ASSISTANT

## 2024-01-20 PROCEDURE — 99213 OFFICE O/P EST LOW 20 MIN: CPT | Performed by: PHYSICIAN ASSISTANT

## 2024-01-20 PROCEDURE — 87493 C DIFF AMPLIFIED PROBE: CPT | Mod: XU | Performed by: PHYSICIAN ASSISTANT

## 2024-01-20 RX ORDER — VANCOMYCIN HYDROCHLORIDE 125 MG/1
CAPSULE ORAL
Qty: 102 CAPSULE | Refills: 0 | Status: SHIPPED | OUTPATIENT
Start: 2024-01-20 | End: 2024-03-02

## 2024-01-20 ASSESSMENT — ENCOUNTER SYMPTOMS
MUSCULOSKELETAL NEGATIVE: 1
DIARRHEA: 1
CONSTITUTIONAL NEGATIVE: 1

## 2024-01-20 NOTE — TELEPHONE ENCOUNTER
Nurse Triage SBAR    Is this a 2nd Level Triage? NO    Situation: Appointment Question    Background: :Patient was diagnosed and treated for C Diff about 3 weeks ago.    Assessment: Patient reports onset of diarrhea again today. He was encouraged to schedule an E-Visit, but hasn't been contacted by PCP.     Encouraged patient to resubmit an E-Visit, but select Next Available Provider.    No additional concerns or questions.    Cierra Anderson RN  01/19/24 8:26 PM  [facility] Nurse Advisor        Reason for Disposition   Health Information question, no triage required and triager able to answer question    Additional Information   Negative: [1] Caller is not with the adult (patient) AND [2] reporting urgent symptoms   Negative: Lab result questions   Negative: Medication questions   Negative: Caller can't be reached by phone   Negative: Caller has already spoken to PCP or another triager   Negative: RN needs further essential information from caller in order to complete triage   Negative: Requesting regular office appointment   Negative: [1] Caller requesting NON-URGENT health information AND [2] PCP's office is the best resource    Protocols used: Information Only Call - No Triage-A-

## 2024-01-20 NOTE — PATIENT INSTRUCTIONS
Dear Too Osman,    We are sorry you are not feeling well. Based on the responses you provided, it is recommended that you be seen in-person in urgent care so we can better evaluate your symptoms. Please click here to find the nearest urgent care location to you.   You will not be charged for this Visit. Thank you for trusting us with your care.    Tyron Cerda NP

## 2024-01-20 NOTE — ED PROVIDER NOTES
History     Chief Complaint   Patient presents with    Diarrhea     Patient states he was treated for c diff. 12/31/2023. He reports his symptoms of reoccurring stool frequency subsided , however, has now returned.      HPI  Too Osman is a 53 year old male who presents to Urgent Care with complaints of recurrence of profuse nonbloody watery diarrhea since yesterday.  Patient complains of associated abdominal cramping.  Patient was diagnosed with C. diff on 12/31/2023.  This was presumably precipitated by course of Augmentin.  Patient was treated with 10-day course of oral Vancomycin at that time with improvement in symptoms.  He states he was feeling much improved until his symptoms returned again.  Patient states symptoms feel consistent with his past episode of C. difficile.  This would be his second episode.  Denies fevers, chills, nausea, vomiting, abdominal pain, or urinary symptoms.      Allergies:  Allergies   Allergen Reactions    Shellfish-Derived Products Other (See Comments)     Not sure, but thinks this may cause throat problems       Problem List:    Patient Active Problem List    Diagnosis Date Noted    Irritable bowel syndrome with diarrhea 05/11/2021     Priority: Medium    Anxiety 05/11/2021     Priority: Medium    Hypertriglyceridemia 08/22/2018     Priority: Medium        Past Medical History:    No past medical history on file.    Past Surgical History:    No past surgical history on file.    Family History:    Family History   Problem Relation Age of Onset    Anxiety Disorder Mother     Arthritis Father         gout    Other Cancer Father         ??melanoma    Dementia Maternal Grandfather     Eye Disorder Paternal Grandmother         mac degen    Hypertension Paternal Grandmother        Social History:  Marital Status:   [2]  Social History     Tobacco Use    Smoking status: Never    Smokeless tobacco: Current     Types: Chew    Tobacco comments:     tin lasts 4 days   Vaping  Use    Vaping Use: Never used   Substance Use Topics    Alcohol use: Yes     Alcohol/week: 0.0 standard drinks of alcohol     Comment: 8-10 beers on the weekends     Drug use: Never        Medications:    vancomycin (VANCOCIN) 125 MG capsule  benzonatate (TESSALON) 100 MG capsule  loperamide (IMODIUM A-D) 2 MG tablet          Review of Systems   Constitutional: Negative.    Gastrointestinal:  Positive for diarrhea.   Genitourinary: Negative.    Musculoskeletal: Negative.    All other systems reviewed and are negative.      Physical Exam   BP: 107/70  Pulse: 85  Temp: 98.7  F (37.1  C)  Resp: 22  SpO2: 97 %      Physical Exam  Constitutional:       General: He is not in acute distress.     Appearance: Normal appearance. He is well-developed. He is not ill-appearing, toxic-appearing or diaphoretic.   HENT:      Head: Normocephalic and atraumatic.   Eyes:      Conjunctiva/sclera: Conjunctivae normal.   Cardiovascular:      Rate and Rhythm: Normal rate and regular rhythm.      Pulses: Normal pulses.      Heart sounds: Normal heart sounds.   Pulmonary:      Effort: Pulmonary effort is normal.      Breath sounds: Normal breath sounds.   Abdominal:      General: There is no distension.      Palpations: Abdomen is soft.      Tenderness: There is no abdominal tenderness. There is no guarding or rebound.   Musculoskeletal:         General: Normal range of motion.      Cervical back: Neck supple.   Skin:     General: Skin is warm and dry.      Capillary Refill: Capillary refill takes less than 2 seconds.   Neurological:      Mental Status: He is alert.      Sensory: Sensation is intact.      Motor: Motor function is intact.   Psychiatric:         Behavior: Behavior is cooperative.         ED Course                 Procedures      No results found for this or any previous visit (from the past 24 hour(s)).    Medications - No data to display    Assessments & Plan (with Medical Decision Making)     Pt is a 53 year old male who  presents to Urgent Care with complaints of recurrence of profuse nonbloody watery diarrhea since yesterday.  Patient complains of associated abdominal cramping.  Patient was diagnosed with C. diff on 12/31/2023.  This was presumably precipitated by course of Augmentin.  Patient was treated with 10-day course of oral Vancomycin at that time with improvement in symptoms.  He states he was feeling much improved until his symptoms returned again.  Patient states symptoms feel consistent with his past episode of C. difficile.      Pt is afebrile on arrival.  Exam as above.  Pt provided another stool sample for repeat C. Diff testing.  Discussed with GI and will place patient on Vancomycin taper over 6 weeks.  They will want to see patient if he has a third recurrence.  Encouraged symptomatic treatments at home.  Return precautions were reviewed.  Hand-outs were provided.    Patient was sent with Vancomycin taper and was instructed to follow-up with PCP for continued care and management.  He is to return to the ED for persistent and/or worsening symptoms.  Patient expressed understanding of the diagnosis and plan and was discharged home in good condition.    I have reviewed the nursing notes.    I have reviewed the findings, diagnosis, plan and need for follow up with the patient.    Discharge Medication List as of 1/20/2024 10:29 AM        START taking these medications    Details   vancomycin (VANCOCIN) 125 MG capsule Take 1 capsule (125 mg) by mouth 4 times daily for 14 days, THEN 1 capsule (125 mg) 3 times daily for 7 days, THEN 1 capsule (125 mg) 2 times daily for 7 days, THEN 1 capsule (125 mg) daily for 7 days, THEN 1 capsule (125 mg) every other day for 7 days.,  Disp-102 capsule, R-0, E-Prescribe             Final diagnoses:   Diarrhea   Hx of Clostridium difficile infection       1/20/2024   [facility] EMERGENCY DEPT      Disclaimer:  This note consists of symbols derived from keyboarding,  dictation and/or voice recognition software.  As a result, there may be errors in the script that have gone undetected.  Please consider this when interpreting information found in this chart.     Roay Anderson PA-C  01/20/24 1055

## 2024-01-21 ENCOUNTER — TELEPHONE (OUTPATIENT)
Dept: EMERGENCY MEDICINE | Facility: CLINIC | Age: 54
End: 2024-01-21
Payer: COMMERCIAL

## 2024-01-21 NOTE — TELEPHONE ENCOUNTER
"[facility] Emergency Department/Urgent Care Lab result notification  [Note:  ED Lab Results RN will reference the [facility] Emergency Dept visit note prior to contacting patient AND/OR prior to consulting Emergency Dept Provider.  Highlights of Emergency Dept visit in information summary at the bottom of this telephone note]    1. Reason for call  Notify of lab results  Assess patient symptoms [if necessary]  Review ED Providers recommendations/discharge instructions (if necessary)  Advise per [facility] ED lab result protocol    2. Lab Result (including Rx patient on, if applicable).  If culture, copy of lab report at bottom.  Final C.difficile Toxin B PCR with reflex to C.difficile Antigen and Toxins A/B EIA is POSITIVE.  (PCR and Toxin are all POSITIVE)  [facility] Emergency Dept discharge antibiotic: vancomycin (VANCOCIN) 125 MG capsule Take 1 capsule (125 mg) by mouth 4 times daily for 14 days, THEN 1 capsule (125 mg) 3 times daily for 7 days, THEN 1 capsule (125 mg) 2 times daily for 7 days, THEN 1 capsule (125 mg) daily for 7 days, THEN 1 capsule (125 mg) every other day for 7 days. - Oral  Recommendations in treatment per [facility] ED Lab result Clostridium difficile protocol.     3. RN Assessment (Patient's current Symptoms):  Time of call: 1/21/2024 10:21 AM  Assessment: Recent C.diff treatment 12/31, this is 2nd occurrence, patient picked up vancomycin and is started on it, patient reports \"I think I'm feeling better, its not getting any worse\"  Fever:  None  Diarrhea: Yes, 4 episodes in 24 hours, liquid stools, no blood  Nausea/vomiting: None  Hydration:  drinking fluids, doing electrolyte replacement, producing urine, voided this AM,   Abdominal pain:  a little cramping 'not severe'    4. RN Recommendations/Instructions per [facility] ED lab result protocol  [facility] ED lab result protocol used: C.diff  patient was notified of lab result and treatment " recommendations  RN reviewed information about C.diff organism, positive lab results, follow up with PCP in 2 weeks, rest, fluids, electrolytes, dietary choices, bleach based disinfectant, colonization, probiotic, returning for any worsening fevers, abdominal papin, intractable vomiting, diarrhea>6 large stools/blood, dehydration, weak/pale/faint.  All questions answered patient verbalized understanding and agrees with plan.      5. Please Contact your PCP clinic or return to the Emergency department if your:  Symptoms return.  Symptoms do not improve after 3 days on antibiotic.  Symptoms do not resolve after completing antibiotic.  Symptoms worsen or other concerning symptoms.    Don Urban RN  [facility]  Emergency Dept Lab Result RN  Ph# [phone]

## 2024-01-27 ENCOUNTER — TRANSFERRED RECORDS (OUTPATIENT)
Dept: HEALTH INFORMATION MANAGEMENT | Facility: CLINIC | Age: 54
End: 2024-01-27
Payer: COMMERCIAL

## 2024-09-26 ENCOUNTER — OFFICE VISIT (OUTPATIENT)
Dept: FAMILY MEDICINE | Facility: CLINIC | Age: 54
End: 2024-09-26
Payer: COMMERCIAL

## 2024-09-26 VITALS
OXYGEN SATURATION: 96 % | HEIGHT: 73 IN | BODY MASS INDEX: 26.11 KG/M2 | TEMPERATURE: 98.1 F | RESPIRATION RATE: 20 BRPM | DIASTOLIC BLOOD PRESSURE: 76 MMHG | SYSTOLIC BLOOD PRESSURE: 110 MMHG | HEART RATE: 75 BPM | WEIGHT: 197 LBS

## 2024-09-26 DIAGNOSIS — Z00.00 ROUTINE GENERAL MEDICAL EXAMINATION AT A HEALTH CARE FACILITY: ICD-10-CM

## 2024-09-26 DIAGNOSIS — Z12.83 SKIN CANCER SCREENING: ICD-10-CM

## 2024-09-26 DIAGNOSIS — Z12.5 SCREENING FOR PROSTATE CANCER: ICD-10-CM

## 2024-09-26 DIAGNOSIS — Z12.11 SCREEN FOR COLON CANCER: ICD-10-CM

## 2024-09-26 DIAGNOSIS — Z13.1 SCREENING FOR DIABETES MELLITUS: ICD-10-CM

## 2024-09-26 DIAGNOSIS — R53.83 OTHER FATIGUE: Primary | ICD-10-CM

## 2024-09-26 DIAGNOSIS — E78.1 HYPERTRIGLYCERIDEMIA: ICD-10-CM

## 2024-09-26 PROBLEM — A04.72 CLOSTRIDIUM DIFFICILE COLITIS: Status: RESOLVED | Noted: 2024-09-26 | Resolved: 2024-09-26

## 2024-09-26 LAB
ALBUMIN SERPL BCG-MCNC: 4.5 G/DL (ref 3.5–5.2)
ALP SERPL-CCNC: 65 U/L (ref 40–150)
ALT SERPL W P-5'-P-CCNC: 31 U/L (ref 0–70)
ANION GAP SERPL CALCULATED.3IONS-SCNC: 10 MMOL/L (ref 7–15)
AST SERPL W P-5'-P-CCNC: 30 U/L (ref 0–45)
BILIRUB SERPL-MCNC: 0.5 MG/DL
BUN SERPL-MCNC: 11.6 MG/DL (ref 6–20)
CALCIUM SERPL-MCNC: 9.7 MG/DL (ref 8.8–10.4)
CHLORIDE SERPL-SCNC: 103 MMOL/L (ref 98–107)
CHOLEST SERPL-MCNC: 210 MG/DL
CREAT SERPL-MCNC: 0.98 MG/DL (ref 0.67–1.17)
EGFRCR SERPLBLD CKD-EPI 2021: >90 ML/MIN/1.73M2
EST. AVERAGE GLUCOSE BLD GHB EST-MCNC: 105 MG/DL
FASTING STATUS PATIENT QL REPORTED: NO
FASTING STATUS PATIENT QL REPORTED: NO
GLUCOSE SERPL-MCNC: 93 MG/DL (ref 70–99)
HBA1C MFR BLD: 5.3 % (ref 0–5.6)
HCO3 SERPL-SCNC: 27 MMOL/L (ref 22–29)
HDLC SERPL-MCNC: 36 MG/DL
LDLC SERPL CALC-MCNC: 134 MG/DL
NONHDLC SERPL-MCNC: 174 MG/DL
POTASSIUM SERPL-SCNC: 4.6 MMOL/L (ref 3.4–5.3)
PROT SERPL-MCNC: 7.2 G/DL (ref 6.4–8.3)
PSA SERPL DL<=0.01 NG/ML-MCNC: 0.32 NG/ML (ref 0–3.5)
SODIUM SERPL-SCNC: 140 MMOL/L (ref 135–145)
TRIGL SERPL-MCNC: 200 MG/DL

## 2024-09-26 PROCEDURE — 99213 OFFICE O/P EST LOW 20 MIN: CPT | Mod: 25 | Performed by: PHYSICIAN ASSISTANT

## 2024-09-26 PROCEDURE — 83036 HEMOGLOBIN GLYCOSYLATED A1C: CPT | Performed by: PHYSICIAN ASSISTANT

## 2024-09-26 PROCEDURE — 36415 COLL VENOUS BLD VENIPUNCTURE: CPT | Performed by: PHYSICIAN ASSISTANT

## 2024-09-26 PROCEDURE — 80061 LIPID PANEL: CPT | Performed by: PHYSICIAN ASSISTANT

## 2024-09-26 PROCEDURE — G0103 PSA SCREENING: HCPCS | Performed by: PHYSICIAN ASSISTANT

## 2024-09-26 PROCEDURE — 99396 PREV VISIT EST AGE 40-64: CPT | Performed by: PHYSICIAN ASSISTANT

## 2024-09-26 PROCEDURE — 80053 COMPREHEN METABOLIC PANEL: CPT | Performed by: PHYSICIAN ASSISTANT

## 2024-09-26 ASSESSMENT — PAIN SCALES - GENERAL: PAINLEVEL: NO PAIN (0)

## 2024-09-26 NOTE — PATIENT INSTRUCTIONS
Patient Education   Preventive Care Advice   This is general advice given by our system to help you stay healthy. However, your care team may have specific advice just for you. Please talk to your care team about your preventive care needs.  Nutrition  Eat 5 or more servings of fruits and vegetables each day.  Try wheat bread, brown rice and whole grain pasta (instead of white bread, rice, and pasta).  Get enough calcium and vitamin D. Check the label on foods and aim for 100% of the RDA (recommended daily allowance).  Lifestyle  Exercise at least 150 minutes each week  (30 minutes a day, 5 days a week).  Do muscle strengthening activities 2 days a week. These help control your weight and prevent disease.  No smoking.  Wear sunscreen to prevent skin cancer.  Have a dental exam and cleaning every 6 months.  Yearly exams  See your health care team every year to talk about:  Any changes in your health.  Any medicines your care team has prescribed.  Preventive care, family planning, and ways to prevent chronic diseases.  Shots (vaccines)   HPV shots (up to age 26), if you've never had them before.  Hepatitis B shots (up to age 59), if you've never had them before.  COVID-19 shot: Get this shot when it's due.  Flu shot: Get a flu shot every year.  Tetanus shot: Get a tetanus shot every 10 years.  Pneumococcal, hepatitis A, and RSV shots: Ask your care team if you need these based on your risk.  Shingles shot (for age 50 and up)  General health tests  Diabetes screening:  Starting at age 35, Get screened for diabetes at least every 3 years.  If you are younger than age 35, ask your care team if you should be screened for diabetes.  Cholesterol test: At age 39, start having a cholesterol test every 5 years, or more often if advised.  Bone density scan (DEXA): At age 50, ask your care team if you should have this scan for osteoporosis (brittle bones).  Hepatitis C: Get tested at least once in your life.  STIs (sexually  transmitted infections)  Before age 24: Ask your care team if you should be screened for STIs.  After age 24: Get screened for STIs if you're at risk. You are at risk for STIs (including HIV) if:  You are sexually active with more than one person.  You don't use condoms every time.  You or a partner was diagnosed with a sexually transmitted infection.  If you are at risk for HIV, ask about PrEP medicine to prevent HIV.  Get tested for HIV at least once in your life, whether you are at risk for HIV or not.  Cancer screening tests  Cervical cancer screening: If you have a cervix, begin getting regular cervical cancer screening tests starting at age 21.  Breast cancer scan (mammogram): If you've ever had breasts, begin having regular mammograms starting at age 40. This is a scan to check for breast cancer.  Colon cancer screening: It is important to start screening for colon cancer at age 45.  Have a colonoscopy test every 10 years (or more often if you're at risk) Or, ask your provider about stool tests like a FIT test every year or Cologuard test every 3 years.  To learn more about your testing options, visit:   .  For help making a decision, visit:   https://bit.ly/zb52787.  Prostate cancer screening test: If you have a prostate, ask your care team if a prostate cancer screening test (PSA) at age 55 is right for you.  Lung cancer screening: If you are a current or former smoker ages 50 to 80, ask your care team if ongoing lung cancer screenings are right for you.  For informational purposes only. Not to replace the advice of your health care provider. Copyright   2023 [employer]. All rights reserved. Clinically reviewed by the [facility] Transitions Program. [employer] [id number] - REV 01/24.

## 2024-09-26 NOTE — PROGRESS NOTES
"Preventive Care Visit  [facility]  Martell Loomis PA-C, Family Medicine  Sep 26, 2024      Assessment & Plan   Other fatigue  Chronic. Work up has been negative. I think this is mental health related. He is not interested in medicine or therapy at this time. I think he would benefit from lifestyle changes including exercise, better diet, and getting new hobbies. Follow-up as needed based on symptoms.     Hypertriglyceridemia  Due for recheck.   - Lipid panel reflex to direct LDL Non-fasting; Future  - Comprehensive metabolic panel; Future    Routine general medical examination at a health care facility  54 yr old here for physical exam. Last physical exam done 1 year ago. Discussed preventative screenings which are updated below. Counseled on immunizations and healthy lifestyle. Follow-up in 1 year for repeat physical exam.     Screen for colon cancer  Due for screening.   - Colonoscopy Screening  Referral; Future    Screening for diabetes mellitus  Due for screening.   - Hemoglobin A1c; Future    Skin cancer screening  Per patient request.   - Adult Dermatology  Referral; Future    Screening for prostate cancer  Due for screening.   - PSA, screen; Future    Patient has been advised of split billing requirements and indicates understanding: Yes      Nicotine/Tobacco Cessation  He reports that he has never smoked. His smokeless tobacco use includes chew.  Nicotine/Tobacco Cessation Plan  Information offered: Patient not interested at this time    BMI  Estimated body mass index is 25.99 kg/m  as calculated from the following:    Height as of this encounter: 1.854 m (6' 1\").    Weight as of this encounter: 89.4 kg (197 lb).     Counseling  Appropriate preventive services were addressed with this patient via screening, questionnaire, or discussion as appropriate for fall prevention, nutrition, physical activity, Tobacco-use cessation, social engagement, weight loss and " cognition.  Checklist reviewing preventive services available has been given to the patient.  Reviewed patient's diet, addressing concerns and/or questions.   He is at risk for lack of exercise and has been provided with information to increase physical activity for the benefit of his well-being.     Subjective   Chase is a 54 year old, presenting for the following:  Gastrointestinal Problem and Physical        9/26/2024     7:04 AM   Additional Questions   Roomed by Rosa VELEZ LPN   Accompanied by Self        Health Care Directive  Patient does not have a Health Care Directive or Living Will: Discussed advance care planning with patient; however, patient declined at this time.    HPI  Concern - Low energy  Onset: 3-4 years  Description: becoming very hard to wake up in the morning.   Intensity: severe  Progression of Symptoms:  worsening  Always this way as a kid.     Concern - GI upset  Went to GI specialist. None of the medication worked and made symptoms worse. Started taking Rolaids and it will get better.         9/24/2024   General Health   How would you rate your overall physical health? Good   Feel stress (tense, anxious, or unable to sleep) To some extent      (!) STRESS CONCERN      9/24/2024   Nutrition   Three or more servings of calcium each day? (!) I DON'T KNOW   Diet: Regular (no restrictions)   How many servings of fruit and vegetables per day? (!) 2-3   How many sweetened beverages each day? (!) 4+          9/24/2024   Exercise   Days per week of moderate/strenous exercise 3 days   Average minutes spent exercising at this level 20 min          9/24/2024   Social Factors   Frequency of gathering with friends or relatives Once a week   Worry food won't last until get money to buy more No   Food not last or not have enough money for food? No   Do you have housing? (Housing is defined as stable permanent housing and does not include staying ouside in a car, in a tent, in an abandoned building, in an  overnight shelter, or couch-surfing.) Yes   Are you worried about losing your housing? No   Lack of transportation? No   Unable to get utilities (heat,electricity)? No          9/24/2024   Fall Risk   Fallen 2 or more times in the past year? No   Trouble with walking or balance? No             9/24/2024   Dental   Dentist two times every year? Yes          9/24/2024   TB Screening   Were you born outside of the [place]? No      Today's PHQ-2 Score:       9/26/2024     6:47 AM   PHQ-2 ( 1999 [employer])   Q1: Little interest or pleasure in doing things 0   Q2: Feeling down, depressed or hopeless 0   PHQ-2 Score 0   Q1: Little interest or pleasure in doing things Not at all   Q2: Feeling down, depressed or hopeless Not at all   PHQ-2 Score 0         9/24/2024   Substance Use   Alcohol more than 3/day or more than 7/wk No   Do you use any other substances recreationally? No      Social History     Tobacco Use    Smoking status: Never    Smokeless tobacco: Current     Types: Chew    Tobacco comments:     tin lasts 4 days   Vaping Use    Vaping status: Never Used   Substance Use Topics    Alcohol use: Yes     Alcohol/week: 0.0 standard drinks of alcohol     Comment: 8-10 beers on the weekends     Drug use: Never           9/24/2024   STI Screening   New sexual partner(s) since last STI/HIV test? No      Last PSA:   PSA   Date Value Ref Range Status   01/26/2021 0.26 0 - 4 ug/L Final     Comment:     Assay Method:  Chemiluminescence using Siemens Vista analyzer     Prostate Specific Antigen Screen   Date Value Ref Range Status   04/12/2023 0.27 0.00 - 3.50 ng/mL Final     ASCVD Risk   The 10-year ASCVD risk score (Terri ASHBY, et al., 2019) is: 7%    Values used to calculate the score:      Age: 54 years      Sex: Male      Is Non- : No      Diabetic: No      Tobacco smoker: No      Systolic Blood Pressure: 110 mmHg      Is BP treated: No      HDL Cholesterol: 39 mg/dL      Total Cholesterol: 236  "mg/dL       Reviewed and updated as needed this visit by Provider   Tobacco  Allergies  Meds  Problems  Med Hx  Surg Hx  Fam Hx            Review of Systems  See HPI      Objective    Exam  /76   Pulse 75   Temp 98.1  F (36.7  C) (Tympanic)   Resp 20   Ht 1.854 m (6' 1\")   Wt 89.4 kg (197 lb)   SpO2 96%   BMI 25.99 kg/m     Estimated body mass index is 25.99 kg/m  as calculated from the following:    Height as of this encounter: 1.854 m (6' 1\").    Weight as of this encounter: 89.4 kg (197 lb).    Physical Exam  GENERAL: alert and no distress  NECK: no adenopathy, no asymmetry, masses, or scars  RESP: lungs clear to auscultation - no rales, rhonchi or wheezes  CV: regular rate and rhythm, normal S1 S2, no S3 or S4, no murmur, click or rub, no peripheral edema  ABDOMEN: soft, nontender, no hepatosplenomegaly, no masses and bowel sounds normal  MS: no gross musculoskeletal defects noted, no edema    Signed Electronically by: Martell Loomis PA-C    "

## 2025-03-24 ENCOUNTER — OFFICE VISIT (OUTPATIENT)
Dept: DERMATOLOGY | Facility: CLINIC | Age: 55
End: 2025-03-24
Attending: PHYSICIAN ASSISTANT
Payer: COMMERCIAL

## 2025-03-24 DIAGNOSIS — D22.9 MULTIPLE BENIGN NEVI: Primary | ICD-10-CM

## 2025-03-24 DIAGNOSIS — D23.9 DERMAL NEVUS: ICD-10-CM

## 2025-03-24 DIAGNOSIS — D18.01 ANGIOMA OF SKIN: ICD-10-CM

## 2025-03-24 DIAGNOSIS — L82.1 SEBORRHEIC KERATOSES: ICD-10-CM

## 2025-03-24 DIAGNOSIS — D48.5 NEOPLASM OF UNCERTAIN BEHAVIOR OF SKIN: ICD-10-CM

## 2025-03-24 DIAGNOSIS — B07.0 PLANTAR WART: ICD-10-CM

## 2025-03-24 DIAGNOSIS — Z12.83 SKIN CANCER SCREENING: ICD-10-CM

## 2025-03-24 DIAGNOSIS — L81.4 LENTIGO: ICD-10-CM

## 2025-03-24 PROCEDURE — 11102 TANGNTL BX SKIN SINGLE LES: CPT | Performed by: DERMATOLOGY

## 2025-03-24 PROCEDURE — 99203 OFFICE O/P NEW LOW 30 MIN: CPT | Mod: 25 | Performed by: DERMATOLOGY

## 2025-03-24 NOTE — PATIENT INSTRUCTIONS
Wound Care Instructions     FOR [place]ICIAL WOUNDS     [facility] [phone]                         AFTER 24 HOURS YOU SHOULD REMOVE THE BANDAGE AND BEGIN DAILY DRESSING CHANGES AS FOLLOWS:     1) Remove Dressing.     2) Clean and dry the area with tap water using a Q-tip or sterile gauze pad.     3) Apply Vaseline, Aquaphor, Polysporin ointment or Bacitracin ointment over entire wound.  Do NOT use Neosporin ointment.     4) Cover the wound with a band-aid, or a sterile non-stick gauze pad and micropore paper tape      REPEAT THESE INSTRUCTIONS AT LEAST ONCE A DAY UNTIL THE WOUND HAS COMPLETELY HEALED.    It is an old wives tale that a wound heals better when it is exposed to air and allowed to dry out. The wound will heal faster with a better cosmetic result if it is kept moist with ointment and covered with a bandage.    **Do not let the wound dry out.**      Supplies Needed:      *Cotton tipped applicators (Q-tips)    *Polysporin Ointment or Bacitracin Ointment (NOT NEOSPORIN)    *Band-aids or non-stick gauze pads and micropore paper tape.      PATIENT INFORMATION:    During the healing process you will notice a number of changes. All wounds develop a small halo of redness surrounding the wound.  This means healing is occurring. Severe itching with extensive redness usually indicates sensitivity to the ointment or bandage tape used to dress the wound.  You should call our office if this develops.      Swelling  and/or discoloration around your surgical site is common, particularly when performed around the eye.    All wounds normally drain.  The larger the wound the more drainage there will be.  After 7-10 days, you will notice the wound beginning to shrink and new skin will begin to grow.  The wound is healed when you can see skin has formed over the entire area.  A healed wound has a healthy, shiny look to the surface and is red to dark pink in color to  normalize.  Wounds may take approximately 4-6 weeks to heal.  Larger wounds may take 6-8 weeks.  After the wound is healed you may discontinue dressing changes.    You may experience a sensation of tightness as your wound heals. This is normal and will gradually subside.    Your healed wound may be sensitive to temperature changes. This sensitivity improves with time, but if you re having a lot of discomfort, try to avoid temperature extremes.    Patients frequently experience itching after their wound appears to have healed because of the continue healing under the skin.  Plain Vaseline will help relieve the itching.        POSSIBLE COMPLICATIONS    BLEEDING:    Leave the bandage in place.  Use tightly rolled up gauze or a cloth to apply direct pressure over the bandage for 30  minutes.  Reapply pressure for an additional 30 minutes if necessary  Use additional gauze and tape to maintain pressure once the bleeding has stopped.     Warts are caused by the Human Papilloma Virus.They are commonly spread by direct contact or autoinoculation. That mean if the wart is picked at it could spread to another area of skin.   In children without treatment 50% of warts will disappear spontaneous in 6 months, 90% are gone in 2 years. In adults they can last for a longer period of time, but they can resolve without treatment.   No method of treatment is better than the other. You just have to be consistent with your treatments and return every 4-6 weeks.   In between treatments you should use either salicylic acid or mediplast tape:  Mediplast tape: Cut to size of wart, leave tape on for 1 week, (Put duct tape over it to secure it). In 1 week, pare skin down with a pumice stone and repeat. You want to pare down until you see some pinpoint bleeding. Do this for 6-8 weeks, if no improvement, make follow up appt.     Wart stick can be purchased online. (Twice a day, warts turn white, then pare down with a pumice stone and repeat) Do  this for 6-8 weeks, if no improvement, make follow up appt.

## 2025-03-24 NOTE — PROGRESS NOTES
Too Osman is an extremely pleasant 54 year old year old male patient I was asked to see by MUMTAZ Loomis  for spots on skin.  .  Patient has no other skin complaints today.  Remainder of the HPI, Meds, PMH, Allergies, FH, and SH was reviewed in chart.      Past Medical History:   Diagnosis Date    Clostridium difficile colitis 09/26/2024       History reviewed. No pertinent surgical history.     Family History   Problem Relation Age of Onset    Anxiety Disorder Mother     Arthritis Father         gout    Other Cancer Father         ??melanoma    Dementia Maternal Grandfather     Eye Disorder Paternal Grandmother         mac degen    Hypertension Paternal Grandmother        Social History     Socioeconomic History    Marital status:      Spouse name: Not on file    Number of children: Not on file    Years of education: Not on file    Highest education level: Not on file   Occupational History    Not on file   Tobacco Use    Smoking status: Never    Smokeless tobacco: Current     Types: Chew    Tobacco comments:     tin lasts 4 days   Vaping Use    Vaping status: Never Used   Substance and Sexual Activity    Alcohol use: Yes     Alcohol/week: 0.0 standard drinks of alcohol     Comment: 8-10 beers on the weekends     Drug use: Never    Sexual activity: Yes     Partners: Female   Other Topics Concern    Parent/sibling w/ CABG, MI or angioplasty before 65F 55M? No   Social History Narrative    Not on file     Social Drivers of Health     Financial Resource Strain: Low Risk  (9/24/2024)    Financial Resource Strain     Within the past 12 months, have you or your family members you live with been unable to get utilities (heat, electricity) when it was really needed?: No   Food Insecurity: Low Risk  (9/24/2024)    Food Insecurity     Within the past 12 months, did you worry that your food would run out before you got money to buy more?: No     Within the past 12 months, did the food you bought just not last and  you didn t have money to get more?: No   Transportation Needs: Low Risk  (9/24/2024)    Transportation Needs     Within the past 12 months, has lack of transportation kept you from medical appointments, getting your medicines, non-medical meetings or appointments, work, or from getting things that you need?: No   Physical Activity: Insufficiently Active (9/24/2024)    Exercise Vital Sign     Days of Exercise per Week: 3 days     Minutes of Exercise per Session: 20 min   Stress: Stress Concern Present (9/24/2024)    [employer] - Occupational Stress Questionnaire     Feeling of Stress : To some extent   Social Connections: Unknown (9/24/2024)    Social Connection and Isolation Panel [NHANES]     Frequency of Communication with Friends and Family: Not on file     Frequency of Social Gatherings with Friends and Family: Once a week     Attends Scientologist Services: Not on file     Active Member of Clubs or Organizations: Not on file     Attends Club or Organization Meetings: Not on file     Marital Status: Not on file   Interpersonal Safety: Low Risk  (9/26/2024)    Interpersonal Safety     Do you feel physically and emotionally safe where you currently live?: Yes     Within the past 12 months, have you been hit, slapped, kicked or otherwise physically hurt by someone?: No     Within the past 12 months, have you been humiliated or emotionally abused in other ways by your partner or ex-partner?: No   Housing Stability: Low Risk  (9/24/2024)    Housing Stability     Do you have housing? : Yes     Are you worried about losing your housing?: No       No outpatient encounter medications on file as of 3/24/2025.     No facility-administered encounter medications on file as of 3/24/2025.             O:   NAD, WDWN, Alert & Oriented, Mood & Affect wnl, Vitals stable   General appearance normal   Vitals stable   Alert, oriented and in no acute distress     L nasal tip 3mm shallow divet   L lfoot  wart  pigmented macules on trunk and ext with regular borders and pigment networks      Stuck on papules and brown macules on trunk and ext   Red papules on trunk  Flesh colored papules on trunk     The remainder of the full exam was normal; the following areas were examined:  conjunctiva/lids, , neck, peripheral vascular system, abdomen, lymph nodes, digits/nails, eccrine and apocrine glands, scalp/hair, face, neck, chest, abdomen, buttocks, back, RUE, LUE, RLE, LLE       Eyes: Conjunctivae/lids:Normal     ENT: Lips, mucosa: normal    MSK:Normal    Cardiovascular: peripheral edema none    Pulm: Breathing Normal    Lymph Nodes: No Head and Neck Lymphadenopathy     Neuro/Psych: Orientation:Alert and Orientedx3 ; Mood/Affect:normal       A/P:  1. Seborrheic keratosis, lentigo, angioma, dermal nevus, nevi   2. Wart  Cryo and or mediplast tape discussed with patient   Mediplast tape at home weekly  3. L nasal tip r/o basal cell carcinoma   TANGENTIAL BIOPSY SENT OUT:  After consent, anesthesia with LEC and prep, tangential excision performed and specimen sent out for permanent section histology.  No complications and routine wound care. Patient told to call our office in 1-2 weeks for result.         It was a pleasure speaking to Too Osman today.  Previous clinic notes and pertinent laboratory tests were reviewed prior to Too Osman's visit.  Patient encouraged to perform monthly skin exams.  UV precautions reviewed with patient.  Return to clinic pendning path

## 2025-03-24 NOTE — LETTER
3/24/2025      Too Osman  [address] [facility] [address]      Dear Colleague,    Thank you for referring your patient, Too Osman, to the [facility]. Please see a copy of my visit note below.    Too Osman is an extremely pleasant 54 year old year old male patient I was asked to see by MUMTAZ Loomis  for spots on skin.  .  Patient has no other skin complaints today.  Remainder of the HPI, Meds, PMH, Allergies, FH, and SH was reviewed in chart.      Past Medical History:   Diagnosis Date     Clostridium difficile colitis 09/26/2024       History reviewed. No pertinent surgical history.     Family History   Problem Relation Age of Onset     Anxiety Disorder Mother      Arthritis Father         gout     Other Cancer Father         ??melanoma     Dementia Maternal Grandfather      Eye Disorder Paternal Grandmother         mac degen     Hypertension Paternal Grandmother        Social History     Socioeconomic History     Marital status:      Spouse name: Not on file     Number of children: Not on file     Years of education: Not on file     Highest education level: Not on file   Occupational History     Not on file   Tobacco Use     Smoking status: Never     Smokeless tobacco: Current     Types: Chew     Tobacco comments:     tin lasts 4 days   Vaping Use     Vaping status: Never Used   Substance and Sexual Activity     Alcohol use: Yes     Alcohol/week: 0.0 standard drinks of alcohol     Comment: 8-10 beers on the weekends      Drug use: Never     Sexual activity: Yes     Partners: Female   Other Topics Concern     Parent/sibling w/ CABG, MI or angioplasty before 65F 55M? No   Social History Narrative     Not on file     Social Drivers of Health     Financial Resource Strain: Low Risk  (9/24/2024)    Financial Resource Strain      Within the past 12 months, have you or your family members you live with been unable to get utilities (heat, electricity) when it was  really needed?: No   Food Insecurity: Low Risk  (9/24/2024)    Food Insecurity      Within the past 12 months, did you worry that your food would run out before you got money to buy more?: No      Within the past 12 months, did the food you bought just not last and you didn t have money to get more?: No   Transportation Needs: Low Risk  (9/24/2024)    Transportation Needs      Within the past 12 months, has lack of transportation kept you from medical appointments, getting your medicines, non-medical meetings or appointments, work, or from getting things that you need?: No   Physical Activity: Insufficiently Active (9/24/2024)    Exercise Vital Sign      Days of Exercise per Week: 3 days      Minutes of Exercise per Session: 20 min   Stress: Stress Concern Present (9/24/2024)    [employer] - Occupational Stress Questionnaire      Feeling of Stress : To some extent   Social Connections: Unknown (9/24/2024)    Social Connection and Isolation Panel [NHANES]      Frequency of Communication with Friends and Family: Not on file      Frequency of Social Gatherings with Friends and Family: Once a week      Attends Pentecostalism Services: Not on file      Active Member of Clubs or Organizations: Not on file      Attends Club or Organization Meetings: Not on file      Marital Status: Not on file   Interpersonal Safety: Low Risk  (9/26/2024)    Interpersonal Safety      Do you feel physically and emotionally safe where you currently live?: Yes      Within the past 12 months, have you been hit, slapped, kicked or otherwise physically hurt by someone?: No      Within the past 12 months, have you been humiliated or emotionally abused in other ways by your partner or ex-partner?: No   Housing Stability: Low Risk  (9/24/2024)    Housing Stability      Do you have housing? : Yes      Are you worried about losing your housing?: No       No outpatient encounter medications on file as of 3/24/2025.     No  facility-administered encounter medications on file as of 3/24/2025.             O:   NAD, WDWN, Alert & Oriented, Mood & Affect wnl, Vitals stable   General appearance normal   Vitals stable   Alert, oriented and in no acute distress     L nasal tip 3mm shallow divet   L lfoot wart  pigmented macules on trunk and ext with regular borders and pigment networks      Stuck on papules and brown macules on trunk and ext   Red papules on trunk  Flesh colored papules on trunk     The remainder of the full exam was normal; the following areas were examined:  conjunctiva/lids, , neck, peripheral vascular system, abdomen, lymph nodes, digits/nails, eccrine and apocrine glands, scalp/hair, face, neck, chest, abdomen, buttocks, back, RUE, LUE, RLE, LLE       Eyes: Conjunctivae/lids:Normal     ENT: Lips, mucosa: normal    MSK:Normal    Cardiovascular: peripheral edema none    Pulm: Breathing Normal    Lymph Nodes: No Head and Neck Lymphadenopathy     Neuro/Psych: Orientation:Alert and Orientedx3 ; Mood/Affect:normal       A/P:  1. Seborrheic keratosis, lentigo, angioma, dermal nevus, nevi   2. Wart  Cryo and or mediplast tape discussed with patient   Mediplast tape at home weekly  3. L nasal tip r/o basal cell carcinoma   TANGENTIAL BIOPSY SENT OUT:  After consent, anesthesia with LEC and prep, tangential excision performed and specimen sent out for permanent section histology.  No complications and routine wound care. Patient told to call our office in 1-2 weeks for result.         It was a pleasure speaking to Too Osman today.  Previous clinic notes and pertinent laboratory tests were reviewed prior to Too Osman's visit.  Patient encouraged to perform monthly skin exams.  UV precautions reviewed with patient.  Return to clinic pendning path       Again, thank you for allowing me to participate in the care of your patient.        Sincerely,        Renato Alba MD    Electronically signed

## 2025-08-27 ENCOUNTER — PATIENT OUTREACH (OUTPATIENT)
Dept: CARE COORDINATION | Facility: CLINIC | Age: 55
End: 2025-08-27
Payer: COMMERCIAL